# Patient Record
Sex: MALE | Race: WHITE | Employment: FULL TIME | URBAN - NONMETROPOLITAN AREA
[De-identification: names, ages, dates, MRNs, and addresses within clinical notes are randomized per-mention and may not be internally consistent; named-entity substitution may affect disease eponyms.]

---

## 2017-12-15 ENCOUNTER — HOSPITAL ENCOUNTER (EMERGENCY)
Age: 52
Discharge: HOME | End: 2017-12-15

## 2017-12-15 ENCOUNTER — APPOINTMENT (OUTPATIENT)
Dept: GENERAL RADIOLOGY | Age: 52
End: 2017-12-15

## 2017-12-15 VITALS
TEMPERATURE: 97.8 F | HEART RATE: 86 BPM | OXYGEN SATURATION: 97 % | WEIGHT: 180 LBS | DIASTOLIC BLOOD PRESSURE: 91 MMHG | RESPIRATION RATE: 18 BRPM | HEIGHT: 71 IN | BODY MASS INDEX: 25.2 KG/M2 | SYSTOLIC BLOOD PRESSURE: 144 MMHG

## 2017-12-15 DIAGNOSIS — M77.8 SHOULDER TENDONITIS, RIGHT: Primary | ICD-10-CM

## 2017-12-15 PROCEDURE — 99283 EMERGENCY DEPT VISIT LOW MDM: CPT

## 2017-12-15 PROCEDURE — 73030 X-RAY EXAM OF SHOULDER: CPT

## 2017-12-15 PROCEDURE — 99283 EMERGENCY DEPT VISIT LOW MDM: CPT | Performed by: NURSE PRACTITIONER

## 2017-12-15 PROCEDURE — 6370000000 HC RX 637 (ALT 250 FOR IP): Performed by: NURSE PRACTITIONER

## 2017-12-15 RX ORDER — HYDROCODONE BITARTRATE AND ACETAMINOPHEN 5; 325 MG/1; MG/1
1 TABLET ORAL EVERY 6 HOURS PRN
Qty: 10 TABLET | Refills: 0 | Status: SHIPPED | OUTPATIENT
Start: 2017-12-15 | End: 2018-05-14

## 2017-12-15 RX ORDER — CYCLOBENZAPRINE HCL 10 MG
10 TABLET ORAL 3 TIMES DAILY PRN
Qty: 20 TABLET | Refills: 0 | Status: SHIPPED | OUTPATIENT
Start: 2017-12-15 | End: 2018-05-16 | Stop reason: ALTCHOICE

## 2017-12-15 RX ORDER — HYDROCODONE BITARTRATE AND ACETAMINOPHEN 7.5; 325 MG/1; MG/1
1 TABLET ORAL ONCE
Status: COMPLETED | OUTPATIENT
Start: 2017-12-15 | End: 2017-12-15

## 2017-12-15 RX ADMIN — HYDROCODONE BITARTRATE AND ACETAMINOPHEN 1 TABLET: 7.5; 325 TABLET ORAL at 11:21

## 2017-12-15 ASSESSMENT — PAIN SCALES - GENERAL
PAINLEVEL_OUTOF10: 10
PAINLEVEL_OUTOF10: 5

## 2017-12-15 ASSESSMENT — ENCOUNTER SYMPTOMS
RESPIRATORY NEGATIVE: 1
GASTROINTESTINAL NEGATIVE: 1

## 2017-12-15 ASSESSMENT — PAIN DESCRIPTION - DESCRIPTORS: DESCRIPTORS: CONSTANT;BURNING

## 2017-12-15 ASSESSMENT — PAIN DESCRIPTION - ORIENTATION: ORIENTATION: RIGHT

## 2017-12-15 ASSESSMENT — PAIN DESCRIPTION - LOCATION: LOCATION: SHOULDER

## 2017-12-15 NOTE — ED PROVIDER NOTES
[facility]  eMERGENCY dEPARTMENT eNCOUnter      Pt Name: Anna Mcgrath  MRN: [id number]  Harle[place]foctavio 1965  Date of evaluation: 12/15/2017  Provider: Faina Banda, [address]       Chief Complaint   Patient presents with    Shoulder Pain         HISTORY OF PRESENT ILLNESS   (Location/Symptom, Timing/Onset, Context/Setting, Quality, Duration, Modifying Factors, Severity)  Note limiting factors. Anna Mcgrath is a 45 y.o. male who presents to the emergency department for evaluation of shoulder pain. Pt tells me that he injured his shoulder today removing a chain saw that become lodged in a tree branch. He denies fall as well as chest pain. He has had no abdominal pain or recent illness. He tells me that he has been taking otc pain medication without much improvement in symptoms. HPI    Nursing Notes were reviewed. REVIEW OF SYSTEMS    (2-9 systems for level 4, 10 or more for level 5)     Review of Systems   Constitutional: Negative. Respiratory: Negative. Cardiovascular: Negative. Gastrointestinal: Negative. Musculoskeletal: Positive for arthralgias (right shoulder). A complete review of systems was performed and is negative except as noted above in the HPI. PAST MEDICAL HISTORY     Past Medical History:   Diagnosis Date    Arthritis     Back pain     History of blood transfusion     Hypertension          SURGICAL HISTORY       Past Surgical History:   Procedure Laterality Date    ABDOMEN SURGERY      APPENDECTOMY      CHOLECYSTECTOMY      FRACTURE SURGERY      SPLENECTOMY      SPLENECTOMY N/A 1981    ?          CURRENT MEDICATIONS       Discharge Medication List as of 12/15/2017 11:21 AM      CONTINUE these medications which have NOT CHANGED    Details   pantoprazole (PROTONIX) 40 MG tablet Take 1 tablet by mouth daily, Disp-30 tablet, R-3Print      Acetaminophen (TYLENOL 8 HOUR PO) Take by mouthHistorical Med      ibuprofen (ADVIL;MOTRIN) 200 MG tablet Take 200 mg by mouth every 6 hours as needed for PainHistorical Med      naproxen (NAPROSYN) 250 MG tablet Take 250 mg by mouth 2 times daily as needed for PainHistorical Med             ALLERGIES     Aspirin; Morphine; and Penicillins    FAMILY HISTORY       Family History   Problem Relation Age of Onset    Diabetes Father     Arthritis Father           SOCIAL HISTORY       Social History     Social History    Marital status:      Spouse name: N/A    Number of children: 3    Years of education: 12     Social History Main Topics    Smoking status: Current Every Day Smoker     Packs/day: 1.00     Years: 41.00     Types: Cigarettes    Smokeless tobacco: Current User    Alcohol use No    Drug use: No    Sexual activity: Yes     Partners: Female     Other Topics Concern    None     Social History Narrative    None       SCREENINGS             PHYSICAL EXAM    (up to 7 for level 4, 8 or more for level 5)     ED Triage Vitals [12/15/17 0946]   BP Temp Temp Source Pulse Resp SpO2 Height Weight   (!) 144/91 97.8 °F (36.6 °C) Oral 86 18 97 % 5' 11\" (1.803 m) 180 lb (81.6 kg)       Physical Exam   Constitutional: He is oriented to person, place, and time. He appears well-nourished. HENT:   Head: Normocephalic. Neck:   No direct ttp posterior cervical spine   Cardiovascular: Normal rate, regular rhythm and normal heart sounds. Pulmonary/Chest: Effort normal and breath sounds normal.   Abdominal: Soft. Bowel sounds are normal. There is no tenderness. Musculoskeletal: Normal range of motion. Decreased ROM with ttp anterior right shoulder  CMS intact right upper extremity'  Normal flexion/extension of right elbow/wrist   Neurological: He is alert and oriented to person, place, and time. Skin: Skin is warm and dry.        DIAGNOSTIC RESULTS     EKG: All EKG's are interpreted by the Emergency Department Physician who either signs or Co-signs this chart in the absence of a cardiologist.        RADIOLOGY:   Non-plain film images such as CT, Ultrasound and MRI are read by the radiologist. Plain radiographic images are visualized and preliminarily interpreted by the emergency physician with the below findings:        Interpretation per the Radiologist below, if available at the time of this note:    XR SHOULDER RIGHT (MIN 2 VIEWS)   Final Result   No acute findings. Signed by Dr Kenya Clement on 12/15/2017 10:18 AM            ED BEDSIDE ULTRASOUND:   Performed by ED Physician - none    LABS:  Labs Reviewed - No data to display    All other labs were within normal range or not returned as of this dictation. RE-ASSESSMENT           EMERGENCY DEPARTMENT COURSE and DIFFERENTIAL DIAGNOSIS/MDM:   Vitals:    Vitals:    12/15/17 0946 12/15/17 1127   BP: (!) 144/91 (!) 144/91   Pulse: 86 86   Resp: 18 18   Temp: 97.8 °F (36.6 °C) 97.8 °F (36.6 °C)   TempSrc: Oral    SpO2: 97% 97%   Weight: 180 lb (81.6 kg)    Height: 5' 11\" (1.803 m)        MDM      CONSULTS:  None    PROCEDURES:  Unless otherwise noted below, none     Procedures    FINAL IMPRESSION      1. Shoulder tendonitis, right          DISPOSITION/PLAN   DISPOSITION Decision to Discharge    PATIENT REFERRED TO:  MD Lam [address]  [place] [phone]    Schedule an appointment as soon as possible for a visit in 5 days  fail to improve      DISCHARGE MEDICATIONS:    Attestation: The Prescription Monitoring Report for this patient was reviewed today. ([id number]) DEMETRIS Villa)  Documentation: No signs of potential drug abuse or diversion identified.  DEMETRIS Villa)  Discharge Medication List as of 12/15/2017 11:21 AM           Medication List      START taking these medications    cyclobenzaprine 10 MG tablet  Commonly known as:  FLEXERIL  Take 1 tablet by mouth 3 times daily as needed for Muscle spasms     HYDROcodone-acetaminophen 5-325 MG per tablet  Commonly known as: NORCO  Take 1 tablet by mouth every 6 hours as needed for Pain .         ASK your doctor about these medications    ibuprofen 200 MG tablet  Commonly known as:  ADVIL;MOTRIN     naproxen 250 MG tablet  Commonly known as:  NAPROSYN     pantoprazole 40 MG tablet  Commonly known as:  PROTONIX  Take 1 tablet by mouth daily     TYLENOL 8 HOUR PO           Where to Get Your Medications      You can get these medications from any pharmacy    Bring a paper prescription for each of these medications  · cyclobenzaprine 10 MG tablet  · HYDROcodone-acetaminophen 5-325 MG per tablet           (Please note that portions of this note were completed with a voice recognition program.  Efforts were made to edit the dictations but occasionally words are mis-transcribed.)              Alea Posadas, APRN  12/15/17 7213

## 2018-05-14 ENCOUNTER — HOSPITAL ENCOUNTER (EMERGENCY)
Age: 53
Discharge: HOME | End: 2018-05-14
Attending: EMERGENCY MEDICINE

## 2018-05-14 ENCOUNTER — APPOINTMENT (OUTPATIENT)
Dept: MRI IMAGING | Age: 53
End: 2018-05-14

## 2018-05-14 ENCOUNTER — APPOINTMENT (OUTPATIENT)
Dept: GENERAL RADIOLOGY | Age: 53
End: 2018-05-14

## 2018-05-14 VITALS
DIASTOLIC BLOOD PRESSURE: 64 MMHG | HEART RATE: 82 BPM | RESPIRATION RATE: 16 BRPM | SYSTOLIC BLOOD PRESSURE: 126 MMHG | HEIGHT: 71 IN | WEIGHT: 210 LBS | TEMPERATURE: 98 F | BODY MASS INDEX: 29.4 KG/M2 | OXYGEN SATURATION: 95 %

## 2018-05-14 DIAGNOSIS — S83.232A COMPLEX TEAR OF MEDIAL MENISCUS OF LEFT KNEE, UNSPECIFIED WHETHER OLD OR CURRENT TEAR, INITIAL ENCOUNTER: ICD-10-CM

## 2018-05-14 DIAGNOSIS — M25.562 ACUTE PAIN OF LEFT KNEE: Primary | ICD-10-CM

## 2018-05-14 LAB
ALBUMIN SERPL-MCNC: 4 G/DL (ref 3.5–5.2)
ALP BLD-CCNC: 110 U/L (ref 40–130)
ALT SERPL-CCNC: 19 U/L (ref 5–41)
ANION GAP SERPL CALCULATED.3IONS-SCNC: 12 MMOL/L (ref 7–19)
AST SERPL-CCNC: 11 U/L (ref 5–40)
BASOPHILS ABSOLUTE: 0.1 K/UL (ref 0–0.2)
BASOPHILS RELATIVE PERCENT: 0.5 % (ref 0–1)
BILIRUB SERPL-MCNC: <0.2 MG/DL (ref 0.2–1.2)
BUN BLDV-MCNC: 16 MG/DL (ref 6–20)
C-REACTIVE PROTEIN: 3.65 MG/DL (ref 0–0.5)
CALCIUM SERPL-MCNC: 9.2 MG/DL (ref 8.6–10)
CHLORIDE BLD-SCNC: 100 MMOL/L (ref 98–111)
CO2: 26 MMOL/L (ref 22–29)
CREAT SERPL-MCNC: 0.7 MG/DL (ref 0.5–1.2)
EOSINOPHILS ABSOLUTE: 0.3 K/UL (ref 0–0.6)
EOSINOPHILS RELATIVE PERCENT: 1.5 % (ref 0–5)
GFR NON-AFRICAN AMERICAN: >60
GLUCOSE BLD-MCNC: 123 MG/DL (ref 74–109)
HCT VFR BLD CALC: 44.7 % (ref 42–52)
HEMOGLOBIN: 14.6 G/DL (ref 14–18)
LYMPHOCYTES ABSOLUTE: 2.8 K/UL (ref 1.1–4.5)
LYMPHOCYTES RELATIVE PERCENT: 13.6 % (ref 20–40)
MCH RBC QN AUTO: 30.4 PG (ref 27–31)
MCHC RBC AUTO-ENTMCNC: 32.7 G/DL (ref 33–37)
MCV RBC AUTO: 92.9 FL (ref 80–94)
MONOCYTES ABSOLUTE: 2.1 K/UL (ref 0–0.9)
MONOCYTES RELATIVE PERCENT: 10.3 % (ref 0–10)
NEUTROPHILS ABSOLUTE: 15.3 K/UL (ref 1.5–7.5)
NEUTROPHILS RELATIVE PERCENT: 73.6 % (ref 50–65)
PDW BLD-RTO: 13.3 % (ref 11.5–14.5)
PLATELET # BLD: 441 K/UL (ref 130–400)
PMV BLD AUTO: 8.2 FL (ref 9.4–12.4)
POTASSIUM SERPL-SCNC: 4.2 MMOL/L (ref 3.5–5)
RBC # BLD: 4.81 M/UL (ref 4.7–6.1)
SEDIMENTATION RATE, ERYTHROCYTE: 18 MM/HR (ref 0–15)
SODIUM BLD-SCNC: 138 MMOL/L (ref 136–145)
TOTAL PROTEIN: 7.5 G/DL (ref 6.6–8.7)
WBC # BLD: 20.8 K/UL (ref 4.8–10.8)

## 2018-05-14 PROCEDURE — 6360000004 HC RX CONTRAST MEDICATION: Performed by: EMERGENCY MEDICINE

## 2018-05-14 PROCEDURE — 87040 BLOOD CULTURE FOR BACTERIA: CPT

## 2018-05-14 PROCEDURE — 96375 TX/PRO/DX INJ NEW DRUG ADDON: CPT

## 2018-05-14 PROCEDURE — 80053 COMPREHEN METABOLIC PANEL: CPT

## 2018-05-14 PROCEDURE — 6360000002 HC RX W HCPCS: Performed by: EMERGENCY MEDICINE

## 2018-05-14 PROCEDURE — 6370000000 HC RX 637 (ALT 250 FOR IP): Performed by: EMERGENCY MEDICINE

## 2018-05-14 PROCEDURE — 86140 C-REACTIVE PROTEIN: CPT

## 2018-05-14 PROCEDURE — 99284 EMERGENCY DEPT VISIT MOD MDM: CPT

## 2018-05-14 PROCEDURE — 96365 THER/PROPH/DIAG IV INF INIT: CPT

## 2018-05-14 PROCEDURE — 73560 X-RAY EXAM OF KNEE 1 OR 2: CPT

## 2018-05-14 PROCEDURE — 36415 COLL VENOUS BLD VENIPUNCTURE: CPT

## 2018-05-14 PROCEDURE — 99284 EMERGENCY DEPT VISIT MOD MDM: CPT | Performed by: EMERGENCY MEDICINE

## 2018-05-14 PROCEDURE — 73723 MRI JOINT LWR EXTR W/O&W/DYE: CPT

## 2018-05-14 PROCEDURE — 85025 COMPLETE CBC W/AUTO DIFF WBC: CPT

## 2018-05-14 PROCEDURE — A9577 INJ MULTIHANCE: HCPCS | Performed by: EMERGENCY MEDICINE

## 2018-05-14 PROCEDURE — 85652 RBC SED RATE AUTOMATED: CPT

## 2018-05-14 PROCEDURE — 96366 THER/PROPH/DIAG IV INF ADDON: CPT

## 2018-05-14 RX ORDER — HYDROCODONE BITARTRATE AND ACETAMINOPHEN 5; 325 MG/1; MG/1
1 TABLET ORAL EVERY 6 HOURS PRN
Qty: 12 TABLET | Refills: 0 | Status: SHIPPED | OUTPATIENT
Start: 2018-05-14 | End: 2018-05-17

## 2018-05-14 RX ORDER — LORAZEPAM 1 MG/1
1 TABLET ORAL ONCE
Status: COMPLETED | OUTPATIENT
Start: 2018-05-14 | End: 2018-05-14

## 2018-05-14 RX ORDER — DEXAMETHASONE SODIUM PHOSPHATE 10 MG/ML
10 INJECTION, SOLUTION INTRAMUSCULAR; INTRAVENOUS ONCE
Status: COMPLETED | OUTPATIENT
Start: 2018-05-14 | End: 2018-05-14

## 2018-05-14 RX ORDER — VANCOMYCIN HYDROCHLORIDE 1 G/200ML
1000 INJECTION, SOLUTION INTRAVENOUS ONCE
Status: COMPLETED | OUTPATIENT
Start: 2018-05-14 | End: 2018-05-14

## 2018-05-14 RX ORDER — GABAPENTIN 300 MG/1
300 CAPSULE ORAL 3 TIMES DAILY
Status: DISCONTINUED | OUTPATIENT
Start: 2018-05-14 | End: 2018-05-14 | Stop reason: HOSPADM

## 2018-05-14 RX ORDER — HYDROMORPHONE HCL IN 0.9% NACL 0.5 MG/ML
0.5 SYRINGE (ML) INTRAVENOUS ONCE
Status: COMPLETED | OUTPATIENT
Start: 2018-05-14 | End: 2018-05-14

## 2018-05-14 RX ORDER — HYDROCODONE BITARTRATE AND ACETAMINOPHEN 7.5; 325 MG/1; MG/1
1 TABLET ORAL ONCE
Status: COMPLETED | OUTPATIENT
Start: 2018-05-14 | End: 2018-05-14

## 2018-05-14 RX ADMIN — DEXAMETHASONE SODIUM PHOSPHATE 10 MG: 10 INJECTION, SOLUTION INTRAMUSCULAR; INTRAVENOUS at 04:28

## 2018-05-14 RX ADMIN — GABAPENTIN 300 MG: 300 CAPSULE ORAL at 10:08

## 2018-05-14 RX ADMIN — GADOBENATE DIMEGLUMINE 17 ML: 529 INJECTION, SOLUTION INTRAVENOUS at 13:22

## 2018-05-14 RX ADMIN — VANCOMYCIN HYDROCHLORIDE 1000 MG: 1 INJECTION, SOLUTION INTRAVENOUS at 06:09

## 2018-05-14 RX ADMIN — LORAZEPAM 1 MG: 1 TABLET ORAL at 04:16

## 2018-05-14 RX ADMIN — HYDROCODONE BITARTRATE AND ACETAMINOPHEN 1 TABLET: 7.5; 325 TABLET ORAL at 04:16

## 2018-05-14 RX ADMIN — Medication 0.5 MG: at 13:51

## 2018-05-14 RX ADMIN — GABAPENTIN 300 MG: 300 CAPSULE ORAL at 04:28

## 2018-05-14 ASSESSMENT — ENCOUNTER SYMPTOMS
VOMITING: 0
TROUBLE SWALLOWING: 0
EYE PAIN: 0
SINUS PRESSURE: 0
NAUSEA: 0
WHEEZING: 0
BACK PAIN: 0
PHOTOPHOBIA: 0
CONSTIPATION: 0
CHEST TIGHTNESS: 0
DIARRHEA: 0
COLOR CHANGE: 0
SHORTNESS OF BREATH: 0
ABDOMINAL PAIN: 0

## 2018-05-14 ASSESSMENT — PAIN SCALES - GENERAL
PAINLEVEL_OUTOF10: 6
PAINLEVEL_OUTOF10: 10
PAINLEVEL_OUTOF10: 10

## 2018-05-14 ASSESSMENT — PAIN DESCRIPTION - DESCRIPTORS: DESCRIPTORS: TINGLING;STABBING

## 2018-05-14 ASSESSMENT — PAIN DESCRIPTION - LOCATION: LOCATION: KNEE

## 2018-05-14 ASSESSMENT — PAIN DESCRIPTION - ORIENTATION: ORIENTATION: LEFT

## 2018-05-16 ENCOUNTER — APPOINTMENT (OUTPATIENT)
Dept: GENERAL RADIOLOGY | Age: 53
End: 2018-05-16

## 2018-05-16 ENCOUNTER — HOSPITAL ENCOUNTER (EMERGENCY)
Age: 53
Discharge: HOME | End: 2018-05-16
Payer: COMMERCIAL

## 2018-05-16 VITALS
DIASTOLIC BLOOD PRESSURE: 83 MMHG | HEART RATE: 81 BPM | OXYGEN SATURATION: 97 % | WEIGHT: 210 LBS | HEIGHT: 71 IN | BODY MASS INDEX: 29.4 KG/M2 | SYSTOLIC BLOOD PRESSURE: 146 MMHG | TEMPERATURE: 98.5 F

## 2018-05-16 DIAGNOSIS — M25.511 ACUTE PAIN OF RIGHT SHOULDER: Primary | ICD-10-CM

## 2018-05-16 PROCEDURE — 6360000002 HC RX W HCPCS: Performed by: NURSE PRACTITIONER

## 2018-05-16 PROCEDURE — 99283 EMERGENCY DEPT VISIT LOW MDM: CPT

## 2018-05-16 PROCEDURE — 99283 EMERGENCY DEPT VISIT LOW MDM: CPT | Performed by: NURSE PRACTITIONER

## 2018-05-16 PROCEDURE — 96372 THER/PROPH/DIAG INJ SC/IM: CPT

## 2018-05-16 PROCEDURE — 73030 X-RAY EXAM OF SHOULDER: CPT

## 2018-05-16 RX ORDER — DEXAMETHASONE SODIUM PHOSPHATE 10 MG/ML
10 INJECTION INTRAMUSCULAR; INTRAVENOUS ONCE
Status: COMPLETED | OUTPATIENT
Start: 2018-05-16 | End: 2018-05-16

## 2018-05-16 RX ORDER — PREDNISONE 10 MG/1
10 TABLET ORAL DAILY
Qty: 42 TABLET | Refills: 0 | Status: SHIPPED | OUTPATIENT
Start: 2018-05-16 | End: 2018-05-26

## 2018-05-16 RX ADMIN — DEXAMETHASONE SODIUM PHOSPHATE 10 MG: 10 INJECTION INTRAMUSCULAR; INTRAVENOUS at 22:41

## 2018-05-16 ASSESSMENT — PAIN DESCRIPTION - PAIN TYPE: TYPE: ACUTE PAIN;CHRONIC PAIN

## 2018-05-16 ASSESSMENT — ENCOUNTER SYMPTOMS
DIARRHEA: 0
RHINORRHEA: 0
COUGH: 0
VOMITING: 0
ABDOMINAL PAIN: 0
SORE THROAT: 0
CONSTIPATION: 0
TROUBLE SWALLOWING: 0
SHORTNESS OF BREATH: 0
NAUSEA: 0

## 2018-05-16 ASSESSMENT — PAIN SCALES - GENERAL: PAINLEVEL_OUTOF10: 10

## 2018-05-17 ENCOUNTER — HOSPITAL ENCOUNTER (EMERGENCY)
Age: 53
Discharge: HOME | End: 2018-05-17
Attending: EMERGENCY MEDICINE
Payer: COMMERCIAL

## 2018-05-17 VITALS
BODY MASS INDEX: 28 KG/M2 | TEMPERATURE: 98 F | SYSTOLIC BLOOD PRESSURE: 154 MMHG | HEART RATE: 78 BPM | WEIGHT: 200 LBS | OXYGEN SATURATION: 99 % | RESPIRATION RATE: 20 BRPM | DIASTOLIC BLOOD PRESSURE: 78 MMHG | HEIGHT: 71 IN

## 2018-05-17 DIAGNOSIS — M25.511 ACUTE PAIN OF RIGHT SHOULDER: Primary | ICD-10-CM

## 2018-05-17 PROCEDURE — 99283 EMERGENCY DEPT VISIT LOW MDM: CPT | Performed by: EMERGENCY MEDICINE

## 2018-05-17 PROCEDURE — 6360000002 HC RX W HCPCS: Performed by: EMERGENCY MEDICINE

## 2018-05-17 PROCEDURE — 99282 EMERGENCY DEPT VISIT SF MDM: CPT

## 2018-05-17 PROCEDURE — 96372 THER/PROPH/DIAG INJ SC/IM: CPT

## 2018-05-17 RX ORDER — ONDANSETRON 4 MG/1
4 TABLET, ORALLY DISINTEGRATING ORAL ONCE
Status: COMPLETED | OUTPATIENT
Start: 2018-05-17 | End: 2018-05-17

## 2018-05-17 RX ORDER — OXYCODONE AND ACETAMINOPHEN 10; 325 MG/1; MG/1
1 TABLET ORAL EVERY 6 HOURS PRN
Qty: 15 TABLET | Refills: 0 | Status: SHIPPED | OUTPATIENT
Start: 2018-05-17 | End: 2018-05-20

## 2018-05-17 RX ORDER — HYDROMORPHONE HCL IN 0.9% NACL 0.5 MG/ML
2 SYRINGE (ML) INTRAVENOUS ONCE
Status: COMPLETED | OUTPATIENT
Start: 2018-05-17 | End: 2018-05-17

## 2018-05-17 RX ADMIN — Medication 2 MG: at 04:03

## 2018-05-17 RX ADMIN — ONDANSETRON 4 MG: 4 TABLET, ORALLY DISINTEGRATING ORAL at 04:03

## 2018-05-17 ASSESSMENT — ENCOUNTER SYMPTOMS
APNEA: 0
SINUS PRESSURE: 0
VOICE CHANGE: 0
BLOOD IN STOOL: 0
DIARRHEA: 0
EYE DISCHARGE: 0
NAUSEA: 0
CHOKING: 0
CONSTIPATION: 0
SORE THROAT: 0
FACIAL SWELLING: 0

## 2018-05-17 ASSESSMENT — PAIN SCALES - GENERAL
PAINLEVEL_OUTOF10: 6
PAINLEVEL_OUTOF10: 2

## 2018-05-17 ASSESSMENT — PAIN DESCRIPTION - ORIENTATION: ORIENTATION: RIGHT

## 2018-05-17 ASSESSMENT — PAIN DESCRIPTION - LOCATION: LOCATION: SHOULDER

## 2018-05-19 LAB
BLOOD CULTURE, ROUTINE: NORMAL
CULTURE, BLOOD 2: NORMAL

## 2018-08-23 ENCOUNTER — HOSPITAL ENCOUNTER (EMERGENCY)
Age: 53
Discharge: HOME | End: 2018-08-23
Attending: FAMILY MEDICINE
Payer: COMMERCIAL

## 2018-08-23 ENCOUNTER — APPOINTMENT (OUTPATIENT)
Dept: GENERAL RADIOLOGY | Age: 53
End: 2018-08-23
Payer: COMMERCIAL

## 2018-08-23 VITALS
DIASTOLIC BLOOD PRESSURE: 78 MMHG | BODY MASS INDEX: 25.2 KG/M2 | TEMPERATURE: 98.2 F | OXYGEN SATURATION: 90 % | SYSTOLIC BLOOD PRESSURE: 137 MMHG | RESPIRATION RATE: 16 BRPM | HEART RATE: 83 BPM | HEIGHT: 71 IN | WEIGHT: 180 LBS

## 2018-08-23 DIAGNOSIS — M10.072 ACUTE IDIOPATHIC GOUT OF LEFT ANKLE: ICD-10-CM

## 2018-08-23 DIAGNOSIS — J40 BRONCHITIS: ICD-10-CM

## 2018-08-23 DIAGNOSIS — R07.89 CHEST WALL PAIN: Primary | ICD-10-CM

## 2018-08-23 LAB
ALBUMIN SERPL-MCNC: 4.3 G/DL (ref 3.5–5.2)
ALP BLD-CCNC: 135 U/L (ref 40–130)
ALT SERPL-CCNC: 61 U/L (ref 5–41)
ANION GAP SERPL CALCULATED.3IONS-SCNC: 13 MMOL/L (ref 7–19)
AST SERPL-CCNC: 37 U/L (ref 5–40)
BASE EXCESS ARTERIAL: 3.5 MMOL/L (ref -2–2)
BASOPHILS ABSOLUTE: 0.1 K/UL (ref 0–0.2)
BASOPHILS RELATIVE PERCENT: 0.7 % (ref 0–1)
BILIRUB SERPL-MCNC: <0.2 MG/DL (ref 0.2–1.2)
BUN BLDV-MCNC: 12 MG/DL (ref 6–20)
C-REACTIVE PROTEIN: 3.49 MG/DL (ref 0–0.5)
CALCIUM SERPL-MCNC: 9.6 MG/DL (ref 8.6–10)
CARBOXYHEMOGLOBIN ARTERIAL: 5.4 % (ref 0–5)
CHLORIDE BLD-SCNC: 97 MMOL/L (ref 98–111)
CO2: 26 MMOL/L (ref 22–29)
CREAT SERPL-MCNC: 0.6 MG/DL (ref 0.5–1.2)
D DIMER: 0.43 UG/ML FEU (ref 0–0.48)
EOSINOPHILS ABSOLUTE: 0.1 K/UL (ref 0–0.6)
EOSINOPHILS RELATIVE PERCENT: 0.6 % (ref 0–5)
GFR NON-AFRICAN AMERICAN: >60
GLUCOSE BLD-MCNC: 132 MG/DL (ref 74–109)
HCO3 ARTERIAL: 26.8 MMOL/L (ref 22–26)
HCT VFR BLD CALC: 51.5 % (ref 42–52)
HEMOGLOBIN, ART, EXTENDED: 17.4 G/DL (ref 14–18)
HEMOGLOBIN: 17.3 G/DL (ref 14–18)
LYMPHOCYTES ABSOLUTE: 1.5 K/UL (ref 1.1–4.5)
LYMPHOCYTES RELATIVE PERCENT: 9.4 % (ref 20–40)
MCH RBC QN AUTO: 31.5 PG (ref 27–31)
MCHC RBC AUTO-ENTMCNC: 33.6 G/DL (ref 33–37)
MCV RBC AUTO: 93.6 FL (ref 80–94)
METHEMOGLOBIN ARTERIAL: 1.2 %
MONOCYTES ABSOLUTE: 1.1 K/UL (ref 0–0.9)
MONOCYTES RELATIVE PERCENT: 7.1 % (ref 0–10)
NEUTROPHILS ABSOLUTE: 13.1 K/UL (ref 1.5–7.5)
NEUTROPHILS RELATIVE PERCENT: 81.6 % (ref 50–65)
O2 CONTENT ARTERIAL: 22.3 ML/DL
O2 SAT, ARTERIAL: 91.4 %
O2 THERAPY: ABNORMAL
PCO2 ARTERIAL: 36 MMHG (ref 35–45)
PDW BLD-RTO: 13.3 % (ref 11.5–14.5)
PERFORMED ON: NORMAL
PERFORMED ON: NORMAL
PH ARTERIAL: 7.48 (ref 7.35–7.45)
PLATELET # BLD: 475 K/UL (ref 130–400)
PMV BLD AUTO: 8.5 FL (ref 9.4–12.4)
PO2 ARTERIAL: 74 MMHG (ref 80–100)
POC TROPONIN I: 0 NG/ML (ref 0–0.08)
POC TROPONIN I: 0 NG/ML (ref 0–0.08)
POTASSIUM SERPL-SCNC: 4.5 MMOL/L (ref 3.5–5)
POTASSIUM, WHOLE BLOOD: 4.3
PRO-BNP: 8 PG/ML (ref 0–900)
RBC # BLD: 5.5 M/UL (ref 4.7–6.1)
SODIUM BLD-SCNC: 136 MMOL/L (ref 136–145)
TOTAL PROTEIN: 7.9 G/DL (ref 6.6–8.7)
URIC ACID, SERUM: 6.3 MG/DL (ref 3.4–7)
WBC # BLD: 16.1 K/UL (ref 4.8–10.8)

## 2018-08-23 PROCEDURE — 6360000002 HC RX W HCPCS: Performed by: FAMILY MEDICINE

## 2018-08-23 PROCEDURE — 96374 THER/PROPH/DIAG INJ IV PUSH: CPT

## 2018-08-23 PROCEDURE — 84484 ASSAY OF TROPONIN QUANT: CPT

## 2018-08-23 PROCEDURE — 83880 ASSAY OF NATRIURETIC PEPTIDE: CPT

## 2018-08-23 PROCEDURE — 36415 COLL VENOUS BLD VENIPUNCTURE: CPT

## 2018-08-23 PROCEDURE — 71045 X-RAY EXAM CHEST 1 VIEW: CPT

## 2018-08-23 PROCEDURE — 36600 WITHDRAWAL OF ARTERIAL BLOOD: CPT

## 2018-08-23 PROCEDURE — 84550 ASSAY OF BLOOD/URIC ACID: CPT

## 2018-08-23 PROCEDURE — 86140 C-REACTIVE PROTEIN: CPT

## 2018-08-23 PROCEDURE — 85025 COMPLETE CBC W/AUTO DIFF WBC: CPT

## 2018-08-23 PROCEDURE — 82803 BLOOD GASES ANY COMBINATION: CPT

## 2018-08-23 PROCEDURE — 80053 COMPREHEN METABOLIC PANEL: CPT

## 2018-08-23 PROCEDURE — 96375 TX/PRO/DX INJ NEW DRUG ADDON: CPT

## 2018-08-23 PROCEDURE — 99284 EMERGENCY DEPT VISIT MOD MDM: CPT | Performed by: FAMILY MEDICINE

## 2018-08-23 PROCEDURE — 99285 EMERGENCY DEPT VISIT HI MDM: CPT

## 2018-08-23 PROCEDURE — 85379 FIBRIN DEGRADATION QUANT: CPT

## 2018-08-23 PROCEDURE — 93005 ELECTROCARDIOGRAM TRACING: CPT

## 2018-08-23 PROCEDURE — 2580000003 HC RX 258: Performed by: FAMILY MEDICINE

## 2018-08-23 PROCEDURE — 84132 ASSAY OF SERUM POTASSIUM: CPT

## 2018-08-23 RX ORDER — METHYLPREDNISOLONE SODIUM SUCCINATE 125 MG/2ML
125 INJECTION, POWDER, LYOPHILIZED, FOR SOLUTION INTRAMUSCULAR; INTRAVENOUS ONCE
Status: DISCONTINUED | OUTPATIENT
Start: 2018-08-23 | End: 2018-08-23 | Stop reason: HOSPADM

## 2018-08-23 RX ORDER — SODIUM CHLORIDE 9 MG/ML
INJECTION, SOLUTION INTRAVENOUS CONTINUOUS
Status: DISCONTINUED | OUTPATIENT
Start: 2018-08-23 | End: 2018-08-23 | Stop reason: HOSPADM

## 2018-08-23 RX ORDER — HYDROCODONE BITARTRATE AND ACETAMINOPHEN 5; 325 MG/1; MG/1
2 TABLET ORAL EVERY 6 HOURS PRN
Qty: 12 TABLET | Refills: 0 | Status: SHIPPED | OUTPATIENT
Start: 2018-08-23 | End: 2018-08-26

## 2018-08-23 RX ORDER — METHYLPREDNISOLONE 4 MG/1
TABLET ORAL
Qty: 1 KIT | Refills: 0 | Status: SHIPPED | OUTPATIENT
Start: 2018-08-23 | End: 2019-05-14 | Stop reason: ALTCHOICE

## 2018-08-23 RX ORDER — ONDANSETRON 2 MG/ML
4 INJECTION INTRAMUSCULAR; INTRAVENOUS ONCE
Status: COMPLETED | OUTPATIENT
Start: 2018-08-23 | End: 2018-08-23

## 2018-08-23 RX ORDER — DOXYCYCLINE HYCLATE 100 MG
100 TABLET ORAL 2 TIMES DAILY
Qty: 20 TABLET | Refills: 0 | Status: SHIPPED | OUTPATIENT
Start: 2018-08-23 | End: 2018-09-02

## 2018-08-23 RX ORDER — ONDANSETRON 4 MG/1
4 TABLET, ORALLY DISINTEGRATING ORAL EVERY 8 HOURS PRN
Qty: 15 TABLET | Refills: 0 | Status: SHIPPED | OUTPATIENT
Start: 2018-08-23 | End: 2019-10-06 | Stop reason: ALTCHOICE

## 2018-08-23 RX ADMIN — ONDANSETRON 4 MG: 2 INJECTION, SOLUTION INTRAMUSCULAR; INTRAVENOUS at 13:31

## 2018-08-23 RX ADMIN — Medication 1 MG: at 13:31

## 2018-08-23 RX ADMIN — SODIUM CHLORIDE: 9 INJECTION, SOLUTION INTRAVENOUS at 13:33

## 2018-08-23 ASSESSMENT — ENCOUNTER SYMPTOMS
COUGH: 1
COLOR CHANGE: 0
DIARRHEA: 0
VOMITING: 0
SORE THROAT: 0
BACK PAIN: 0
SHORTNESS OF BREATH: 1
NAUSEA: 0
ABDOMINAL PAIN: 0
WHEEZING: 0

## 2018-08-23 ASSESSMENT — PAIN SCALES - GENERAL
PAINLEVEL_OUTOF10: 8
PAINLEVEL_OUTOF10: 8

## 2018-08-23 ASSESSMENT — PAIN DESCRIPTION - LOCATION: LOCATION: CHEST

## 2018-08-23 NOTE — PROGRESS NOTES
Results for Chuck Rice (MRN [id number]) as of 8/23/2018 13:25   Ref.  Range 8/23/2018 13:23   Hemoglobin, Art, Extended Latest Ref Range: 14.0 - 18.0 g/dL 17.4   pH, Arterial Latest Ref Range: 7.350 - 7.450  7.480 (H)   pCO2, Arterial Latest Ref Range: 35.0 - 45.0 mmHg 36.0   pO2, Arterial Latest Ref Range: 80.0 - 100.0 mmHg 74.0 (L)   HCO3, Arterial Latest Ref Range: 22.0 - 26.0 mmol/L 26.8 (H)   Base Excess, Arterial Latest Ref Range: -2.0 - 2.0 mmol/L 3.5 (H)   O2 Sat, Arterial Latest Ref Range: >92 % 91.4   O2 Content, Arterial Latest Ref Range: Not Established mL/dL 22.3   Methemoglobin, Arterial Latest Ref Range: <1.5 % 1.2   Carboxyhgb, Arterial Latest Ref Range: 0.0 - 5.0 % 5.4 (H)   AT +  R/A   RR  RR 14  Resting

## 2018-08-23 NOTE — ED PROVIDER NOTES
FRACTURE SURGERY      SPLENECTOMY      SPLENECTOMY N/A 1981    ? CURRENT MEDICATIONS       Previous Medications    No medications on file       ALLERGIES     Aspirin; Morphine; and Penicillins    FAMILY HISTORY       Family History   Problem Relation Age of Onset    Diabetes Father     Arthritis Father           SOCIAL HISTORY       Social History     Social History    Marital status:      Spouse name: N/A    Number of children: 3    Years of education: 12     Social History Main Topics    Smoking status: Current Every Day Smoker     Packs/day: 1.00     Years: 41.00     Types: Cigarettes    Smokeless tobacco: Current User    Alcohol use No    Drug use: No    Sexual activity: Yes     Partners: Female     Other Topics Concern    Not on file     Social History Narrative    No narrative on file       SCREENINGS             PHYSICAL EXAM    (up to 7 for level 4, 8 or more for level 5)     ED Triage Vitals [08/23/18 1210]   BP Temp Temp src Pulse Resp SpO2 Height Weight   (!) 157/92 98.5 °F (36.9 °C) -- 100 19 94 % 5' 11\" (1.803 m) 180 lb (81.6 kg)       Physical Exam   Constitutional: He is oriented to person, place, and time. He appears well-developed and well-nourished. HENT:   Head: Normocephalic. Neck: Normal range of motion. Cardiovascular: Normal rate and normal heart sounds. Pulmonary/Chest: Effort normal and breath sounds normal. He exhibits tenderness. Abdominal: Soft. Bowel sounds are normal.   Musculoskeletal:        Feet:    Neurological: He is alert and oriented to person, place, and time. Psychiatric: He has a normal mood and affect.        DIAGNOSTIC RESULTS     EKG: All EKG's are interpreted by the Emergency Department Physician who either signs or Co-signs this chart in the absence of a cardiologist.    EKG at 12:11 PM shows a sinus rhythm with a rate of 92 QRS complexes, ST segments and T waves are all grossly normal    RADIOLOGY:   Non-plain film images such as CT, Ultrasound and MRI are read by the radiologist. Plain radiographic images are visualized and preliminarily interpreted by the emergency physician with the below findings:          XR CHEST PORTABLE   Final Result   1. Mild cardiomegaly with no evidence of pulmonary vascular   congestion. Signed by Dr Khloe Almeida on 8/23/2018 1:27 PM              LABS:  Labs Reviewed   CBC WITH AUTO DIFFERENTIAL - Abnormal; Notable for the following:        Result Value    WBC 16.1 (*)     MCH 31.5 (*)     Platelets 733 (*)     MPV 8.5 (*)     Neutrophils % 81.6 (*)     Lymphocytes % 9.4 (*)     Neutrophils # 13.1 (*)     Monocytes # 1.10 (*)     All other components within normal limits   COMPREHENSIVE METABOLIC PANEL - Abnormal; Notable for the following:     Chloride 97 (*)     Glucose 132 (*)     Alkaline Phosphatase 135 (*)     ALT 61 (*)     All other components within normal limits   BLOOD GAS, ARTERIAL - Abnormal; Notable for the following:     pH, Arterial 7.480 (*)     pO2, Arterial 74.0 (*)     HCO3, Arterial 26.8 (*)     Base Excess, Arterial 3.5 (*)     Carboxyhgb, Arterial 5.4 (*)     All other components within normal limits   C-REACTIVE PROTEIN - Abnormal; Notable for the following:     CRP 3.49 (*)     All other components within normal limits   D-DIMER, QUANTITATIVE   BRAIN NATRIURETIC PEPTIDE   URIC ACID   POTASSIUM, WHOLE BLOOD   POCT TROPONIN   POCT VENOUS   POCT VENOUS   POCT TROPONIN       All other labs were within normal range or not returned as of this dictation.     EMERGENCY DEPARTMENT COURSE and DIFFERENTIAL DIAGNOSIS/MDM:   Vitals:    Vitals:    08/23/18 1210 08/23/18 1301 08/23/18 1306 08/23/18 1430   BP: (!) 157/92 (!) 148/87  120/84   Pulse: 100 86 93 83   Resp: 19   16   Temp: 98.5 °F (36.9 °C)      SpO2: 94% 91% 93% 90%   Weight: 180 lb (81.6 kg)      Height: 5' 11\" (1.803 m)          MDM    Reassessment  3:20 PM patient's symptoms have improved his main complaint now is the

## 2018-08-24 LAB
EKG P AXIS: 49 DEGREES
EKG P AXIS: 52 DEGREES
EKG P-R INTERVAL: 126 MS
EKG P-R INTERVAL: 128 MS
EKG Q-T INTERVAL: 326 MS
EKG Q-T INTERVAL: 348 MS
EKG QRS DURATION: 86 MS
EKG QRS DURATION: 86 MS
EKG QTC CALCULATION (BAZETT): 382 MS
EKG QTC CALCULATION (BAZETT): 386 MS
EKG T AXIS: 1 DEGREES
EKG T AXIS: 7 DEGREES

## 2019-05-14 ENCOUNTER — OFFICE VISIT (OUTPATIENT)
Dept: INTERNAL MEDICINE | Age: 54
End: 2019-05-14

## 2019-05-14 VITALS
BODY MASS INDEX: 27.16 KG/M2 | OXYGEN SATURATION: 98 % | SYSTOLIC BLOOD PRESSURE: 122 MMHG | HEART RATE: 114 BPM | RESPIRATION RATE: 18 BRPM | DIASTOLIC BLOOD PRESSURE: 90 MMHG | HEIGHT: 71 IN | WEIGHT: 194 LBS

## 2019-05-14 DIAGNOSIS — Z00.00 HEALTHCARE MAINTENANCE: ICD-10-CM

## 2019-05-14 DIAGNOSIS — M25.50 ARTHRALGIA, UNSPECIFIED JOINT: Primary | ICD-10-CM

## 2019-05-14 DIAGNOSIS — G89.29 CHRONIC BACK PAIN, UNSPECIFIED BACK LOCATION, UNSPECIFIED BACK PAIN LATERALITY: ICD-10-CM

## 2019-05-14 DIAGNOSIS — D75.839 THROMBOCYTOSIS: Primary | ICD-10-CM

## 2019-05-14 DIAGNOSIS — Z90.81 H/O SPLENECTOMY: ICD-10-CM

## 2019-05-14 DIAGNOSIS — I15.9 SECONDARY HYPERTENSION: ICD-10-CM

## 2019-05-14 DIAGNOSIS — M54.9 CHRONIC BACK PAIN, UNSPECIFIED BACK LOCATION, UNSPECIFIED BACK PAIN LATERALITY: ICD-10-CM

## 2019-05-14 DIAGNOSIS — M00.852 ARTHRITIS OF LEFT HIP DUE TO OTHER BACTERIA (HCC): ICD-10-CM

## 2019-05-14 PROCEDURE — 99204 OFFICE O/P NEW MOD 45 MIN: CPT | Performed by: NURSE PRACTITIONER

## 2019-05-14 RX ORDER — METHOCARBAMOL 500 MG/1
500 TABLET, FILM COATED ORAL 4 TIMES DAILY
Qty: 40 TABLET | Refills: 0
Start: 2019-05-14 | End: 2019-05-24

## 2019-05-14 RX ORDER — SIMVASTATIN 20 MG
20 TABLET ORAL NIGHTLY
Qty: 90 TABLET | Refills: 1 | Status: SHIPPED | OUTPATIENT
Start: 2019-05-14 | End: 2020-02-17 | Stop reason: SDUPTHER

## 2019-05-14 RX ORDER — ROPINIROLE 0.5 MG/1
0.5 TABLET, FILM COATED ORAL 3 TIMES DAILY
Qty: 90 TABLET | Refills: 3 | Status: SHIPPED | OUTPATIENT
Start: 2019-05-14 | End: 2019-09-03 | Stop reason: SDUPTHER

## 2019-05-14 RX ORDER — COLCHICINE 0.6 MG/1
0.6 TABLET ORAL DAILY
COMMUNITY
End: 2019-10-16 | Stop reason: ALTCHOICE

## 2019-05-14 RX ORDER — LISINOPRIL 5 MG/1
5 TABLET ORAL DAILY
COMMUNITY
End: 2019-05-28 | Stop reason: SDUPTHER

## 2019-05-14 ASSESSMENT — ENCOUNTER SYMPTOMS
ABDOMINAL DISTENTION: 0
DIARRHEA: 0
BLOOD IN STOOL: 0
CONSTIPATION: 0
SHORTNESS OF BREATH: 0
WHEEZING: 0
VOMITING: 0
COUGH: 0
CHOKING: 0
COLOR CHANGE: 0
EYE DISCHARGE: 0
SORE THROAT: 0
STRIDOR: 0
EYE ITCHING: 0
NAUSEA: 0
ABDOMINAL PAIN: 0
TROUBLE SWALLOWING: 0

## 2019-05-14 ASSESSMENT — PATIENT HEALTH QUESTIONNAIRE - PHQ9
2. FEELING DOWN, DEPRESSED OR HOPELESS: 0
1. LITTLE INTEREST OR PLEASURE IN DOING THINGS: 0
SUM OF ALL RESPONSES TO PHQ QUESTIONS 1-9: 0
SUM OF ALL RESPONSES TO PHQ QUESTIONS 1-9: 0
SUM OF ALL RESPONSES TO PHQ9 QUESTIONS 1 & 2: 0

## 2019-05-14 NOTE — PROGRESS NOTES
[facility]  [facility]  [address] [place] [address]  Dept: [phone]  Dept Fax: [phone]: [phone]    Nelly Becerra is a 62 y.o. male who presents today for his medical conditions/complaints as noted below. Nelly Becerra is c/sharon Hypertension (Patient is here to establish care for hypertension.) and Gout (Patient has hystory of gout and was treated by urgent care.)        HPI:     HPI      1. HTN:  Stable on current meds; No side effects of the meds; Takes as directed; takes blood pressure 3-4 times a week   2. 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  Gout     Patient has hystory of gout and was treated by urgent care. Past Medical History:   Diagnosis Date    Arthritis     Back pain     History of blood transfusion     Hypertension       Past Surgical History:   Procedure Laterality Date    ABDOMEN SURGERY      APPENDECTOMY      FRACTURE SURGERY      SPLENECTOMY      SPLENECTOMY N/A 1981    ?        Vitals 5/14/2019 5/14/2019 8/23/2018 8/23/2018 8/23/2018 9/79/0277   SYSTOLIC 761 484 116 095 518 -   DIASTOLIC 90 90 78 81 84 -   Pulse - 114 83 89 83 -   Temp - - 98.2 - - -   Resp - 18 16 16 16 -   SpO2 - 98 90 90 90 -   Weight - 194 lb - - - -   Height - 5' 11\" - - - -   BMI (wt*703/ht~2) - 27.05 kg/m2 - - - -   Pain Level - - - - - 8   Some recent data might be hidden       Family History   Problem Relation Age of Onset    Diabetes Father     Arthritis Father        Social History     Tobacco Use    Smoking status: Current Every Day Smoker     Packs/day: 1.00     Years: 41.00     Pack years: 41.00     Types: Cigarettes    Smokeless tobacco: Current User   Substance Use Topics    Alcohol use: No      Current Outpatient Medications   Medication Sig Dispense Refill    lisinopril (PRINIVIL;ZESTRIL) 5 MG tablet Take 5 mg by mouth daily      APPLE CIDER VINEGAR PO Take by mouth      Potassium 99 MG TABS Take by mouth      Misc Natural Products (TART CHERRY ADVANCED PO) Take by mouth      methocarbamol (ROBAXIN) 500 MG tablet Take 1 tablet by mouth 4 times daily for 10 days 40 tablet 0    rOPINIRole (REQUIP) 0.5 MG tablet Take 1 tablet by mouth 3 times daily 90 tablet 3    colchicine (COLCRYS) 0.6 MG tablet Take 0.6 mg by mouth daily      ondansetron (ZOFRAN ODT) 4 MG disintegrating tablet Take 1 tablet by mouth every 8 hours as needed for Nausea or Vomiting 15 tablet 0     No current facility-administered medications for this visit.       Allergies   Allergen Reactions    Aspirin      Pt has tolerated ASA in the past as well as other NSAIDs    Morphine     Penicillins        Health Maintenance   Topic Date Due    Hepatitis C screen  1965    Pneumococcal 0-64 years Vaccine (1 of 1 - PPSV23) 01/18/1969    HIV screen  01/18/1978    DTaP/Tdap/Td vaccine (1 - Tdap) 01/18/1982    Lipid screen  01/18/2003    Diabetes screen  01/18/2003    Shingles Vaccine (1 of 2) 01/18/2013    Colon cancer screen colonoscopy  01/18/2013    Low dose CT lung screening  01/18/2018    Potassium monitoring  08/23/2019    Creatinine monitoring  08/23/2019    Flu vaccine (Season Ended) 09/01/2019       No results found for: LABA1C  No results found for: PSA, PSADIA  No results found for: TSH]  Lab Results   Component Value Date     08/23/2018    K 4.3 08/23/2018    CL 97 (L) 08/23/2018    CO2 26 08/23/2018    BUN 12 08/23/2018    CREATININE 0.6 08/23/2018    GLUCOSE 132 (H) 08/23/2018    CALCIUM 9.6 08/23/2018    PROT 7.9 08/23/2018    LABALBU 4.3 08/23/2018    BILITOT <0.2 08/23/2018    ALKPHOS 135 (H) 08/23/2018    AST 37 08/23/2018    ALT 61 (H) 08/23/2018    LABGLOM >60 08/23/2018     No results found for: CHOL  No results found for: TRIG  No results found for: HDL  No results found for: [facility]  Lab Results   Component Value Date     08/23/2018     03/07/2011    K 4.3 08/23/2018    K 4.5 08/23/2018    K 4.5 03/07/2011    CL 97 08/23/2018     03/07/2011    CO2 26 08/23/2018    BUN 12 08/23/2018    CREATININE 0.6 08/23/2018    CREATININE 0.8 03/07/2011    GLUCOSE 132 08/23/2018    CALCIUM 9.6 08/23/2018      Lab Results   Component Value Date    WBC 16.1 (H) 08/23/2018    HGB 17.3 08/23/2018    HCT 51.5 08/23/2018    MCV 93.6 08/23/2018     (H) 08/23/2018    LABLYMP 3.86 03/07/2011    LYMPHOPCT 9.4 (L) 08/23/2018    RBC 5.50 08/23/2018    MCH 31.5 (H) 08/23/2018    MCHC 33.6 08/23/2018    RDW 13.3 08/23/2018     No results found for: VITD25    Subjective:      Review of Systems   Constitutional: Negative for fatigue, fever and unexpected weight change. HENT: Negative for ear discharge, ear pain, mouth sores, sore throat and trouble swallowing. Eyes: Negative for discharge, itching and visual disturbance. Respiratory: Negative for cough, choking, shortness of breath, wheezing and stridor. Cardiovascular: Negative for chest pain, palpitations and leg swelling. Gastrointestinal: Negative for abdominal distention, abdominal pain, blood in stool, constipation, diarrhea, nausea and vomiting. Endocrine: Negative for cold intolerance, polydipsia and polyuria. Genitourinary: Negative for difficulty urinating, dysuria, frequency and urgency. Musculoskeletal: Positive for arthralgias. Negative for gait problem. Has gout    Skin: Negative for color change and rash. Allergic/Immunologic: Negative for food allergies and immunocompromised state.    Neurological: Negative for dizziness, tremors, syncope, speech difficulty, weakness and headaches. Hematological: Negative for adenopathy. Does not bruise/bleed easily. Psychiatric/Behavioral: Negative for confusion and hallucinations. Objective:     Physical Exam   Constitutional: He is oriented to person, place, and time. He appears well-developed and well-nourished. No distress. HENT:   Head: Normocephalic and atraumatic. Eyes: Pupils are equal, round, and reactive to light. Right eye exhibits no discharge. Left eye exhibits no discharge. No scleral icterus. Neck: Normal range of motion. Neck supple. No JVD present. No thyromegaly present. Cardiovascular: Normal rate, regular rhythm and normal heart sounds. No murmur heard. Pulmonary/Chest: Effort normal and breath sounds normal. No respiratory distress. He has no wheezes. He has no rales. Abdominal: Soft. Bowel sounds are normal. He exhibits no distension and no mass. There is no tenderness. There is no rebound and no guarding. Musculoskeletal: Normal range of motion. He exhibits no edema or tenderness. Neurological: He is alert and oriented to person, place, and time. He has normal reflexes. He displays normal reflexes. No cranial nerve deficit. Coordination normal.   Skin: Skin is warm and dry. No rash noted. No erythema. Psychiatric: His behavior is normal. Judgment and thought content normal. He does not exhibit a depressed mood. BP (!) 122/90   Pulse 114   Resp 18   Ht 5' 11\" (1.803 m)   Wt 194 lb (88 kg)   SpO2 98%   BMI 27.06 kg/m²     Assessment:       Diagnosis Orders   1. Arthralgia, unspecified joint  Uric Acid   2. Arthritis of left hip due to other bacteria ([facility] [address]     3. Chronic back pain, unspecified back location, unspecified back pain laterality     4. Secondary hypertension     5.  Healthcare maintenance  CBC Auto Differential    Comprehensive Metabolic Panel    Lipid Panel    Vitamin D 25 Hydroxy    TSH without Reflex    Psa screening    Hemoglobin A1C Uric Acid     Labs reviewedfrom today     Plan:        Patient given educational materials - see patient instructions. Discussed use, benefit, and side effects of prescribed medications. Allpatient questions answered. Pt voiced understanding. Reviewed health maintenance. Instructed to continue current medications, diet and exercise. Patient agreed with treatment plan. Follow up as directed. MEDICATIONS:  Orders Placed This Encounter   Medications    methocarbamol (ROBAXIN) 500 MG tablet     Sig: Take 1 tablet by mouth 4 times daily for 10 days     Dispense:  40 tablet     Refill:  0    rOPINIRole (REQUIP) 0.5 MG tablet     Sig: Take 1 tablet by mouth 3 times daily     Dispense:  90 tablet     Refill:  3         ORDERS:  Orders Placed This Encounter   Procedures    Uric Acid    CBC Auto Differential    Comprehensive Metabolic Panel    Lipid Panel    Vitamin D 25 Hydroxy    TSH without Reflex    Psa screening    Hemoglobin A1C    Uric Acid       Follow-up:  No follow-ups on file. PATIENT INSTRUCTIONS:  Patient Instructions   1  HTN. Increase lisinopril 5 mg to 10 mg daily;    2  RLS;  Start requip at bedtime; Take for about a week  Then slowly increase to 3 times a day    3. Chronic back pain;  Uses robaxin prn;   4.  Splenectomy  In the teens; Stable   5. MRSA    stble for now; If you have any infections that worsen, let me know;      Electronically signed by DEMETRIS Berry on 5/14/2019 at 10:09 AM    EMRDragon/transcription disclaimer:  Much of this encounter note is electronic transcription/translation of spoken language to printed texts. The electronic translation of spoken language may be erroneous, or at times,nonsensical words or phrases may be inadvertently transcribed.   Although I have reviewed the note for such errors, some may still exist.

## 2019-05-14 NOTE — PATIENT INSTRUCTIONS
1  HTN. Increase lisinopril 5 mg to 10 mg daily;    2  RLS;  Start requip at bedtime; Take for about a week  Then slowly increase to 3 times a day    3. Chronic back pain;  Uses robaxin prn;   4.  Splenectomy  In the teens; Stable   5. MRSA    stble for now;   If you have any infections that worsen, let me know;

## 2019-05-28 ENCOUNTER — TELEPHONE (OUTPATIENT)
Dept: INTERNAL MEDICINE | Age: 54
End: 2019-05-28

## 2019-05-28 ENCOUNTER — TELEPHONE (OUTPATIENT)
Dept: URGENT CARE | Facility: CLINIC | Age: 54
End: 2019-05-28

## 2019-05-28 RX ORDER — LISINOPRIL 5 MG/1
5 TABLET ORAL DAILY
Qty: 90 TABLET | Refills: 2 | Status: SHIPPED | OUTPATIENT
Start: 2019-05-28 | End: 2019-08-01 | Stop reason: SDUPTHER

## 2019-05-28 NOTE — TELEPHONE ENCOUNTER
Pt called stating that he tried to call PCP to get a refill of lisinopril.  Pt stated that Dr. Magallon's office was closed when he called.  I called Dr. Magallon's office at [phone] and talked to a .  I called that patient back and advised that office is currently open.    Miguelina Bueno RN 5/28/2019 8:17 AM

## 2019-05-28 NOTE — TELEPHONE ENCOUNTER
Omari called requesting a refill of the below medication which has been pended for you:     Requested Prescriptions      No prescriptions requested or ordered in this encounter       Last Appointment Date: 5/14/2019  Next Appointment Date: 8/14/2019    Allergies   Allergen Reactions    Aspirin      Pt has tolerated ASA in the past as well as other NSAIDs    Morphine     Penicillins

## 2019-06-14 ENCOUNTER — HOSPITAL ENCOUNTER (OUTPATIENT)
Dept: INFUSION THERAPY | Age: 54
Discharge: HOME | End: 2019-06-14

## 2019-06-14 PROCEDURE — 85025 COMPLETE CBC W/AUTO DIFF WBC: CPT

## 2019-06-23 ENCOUNTER — HOSPITAL ENCOUNTER (OUTPATIENT)
Age: 54
Setting detail: OBSERVATION
Discharge: HOME | End: 2019-06-24
Attending: SURGERY | Admitting: SURGERY
Payer: COMMERCIAL

## 2019-06-23 DIAGNOSIS — T18.5XXA FOREIGN BODY OF RECTUM, INITIAL ENCOUNTER: Primary | ICD-10-CM

## 2019-06-23 PROCEDURE — 99285 EMERGENCY DEPT VISIT HI MDM: CPT

## 2019-06-24 ENCOUNTER — APPOINTMENT (OUTPATIENT)
Dept: GENERAL RADIOLOGY | Age: 54
End: 2019-06-24

## 2019-06-24 ENCOUNTER — ANESTHESIA EVENT (OUTPATIENT)
Dept: OPERATING ROOM | Age: 54
End: 2019-06-24

## 2019-06-24 ENCOUNTER — ANESTHESIA (OUTPATIENT)
Dept: OPERATING ROOM | Age: 54
End: 2019-06-24

## 2019-06-24 VITALS
DIASTOLIC BLOOD PRESSURE: 80 MMHG | RESPIRATION RATE: 18 BRPM | HEIGHT: 71 IN | WEIGHT: 200 LBS | HEART RATE: 73 BPM | OXYGEN SATURATION: 99 % | TEMPERATURE: 97.6 F | SYSTOLIC BLOOD PRESSURE: 139 MMHG | BODY MASS INDEX: 28 KG/M2

## 2019-06-24 VITALS
SYSTOLIC BLOOD PRESSURE: 89 MMHG | RESPIRATION RATE: 7 BRPM | OXYGEN SATURATION: 99 % | DIASTOLIC BLOOD PRESSURE: 61 MMHG

## 2019-06-24 LAB
ANION GAP SERPL CALCULATED.3IONS-SCNC: 9 MMOL/L (ref 7–19)
APTT: 27.4 SEC (ref 26–36.2)
ATYPICAL LYMPHOCYTE RELATIVE PERCENT: 2 % (ref 0–8)
BASOPHILS ABSOLUTE: 0.2 K/UL (ref 0–0.2)
BASOPHILS RELATIVE PERCENT: 1 % (ref 0–1)
BUN BLDV-MCNC: 17 MG/DL (ref 6–20)
CALCIUM SERPL-MCNC: 9.3 MG/DL (ref 8.6–10)
CHLORIDE BLD-SCNC: 103 MMOL/L (ref 98–111)
CO2: 29 MMOL/L (ref 22–29)
CREAT SERPL-MCNC: 0.7 MG/DL (ref 0.5–1.2)
EOSINOPHILS ABSOLUTE: 0.16 K/UL (ref 0–0.6)
EOSINOPHILS RELATIVE PERCENT: 1 % (ref 0–5)
GFR NON-AFRICAN AMERICAN: >60
GLUCOSE BLD-MCNC: 118 MG/DL (ref 74–109)
HCT VFR BLD CALC: 46.5 % (ref 42–52)
HEMOGLOBIN: 15.5 G/DL (ref 14–18)
INR BLD: 1.04 (ref 0.88–1.18)
LYMPHOCYTES ABSOLUTE: 5 K/UL (ref 1.1–4.5)
LYMPHOCYTES RELATIVE PERCENT: 30 % (ref 20–40)
MCH RBC QN AUTO: 30.8 PG (ref 27–31)
MCHC RBC AUTO-ENTMCNC: 33.3 G/DL (ref 33–37)
MCV RBC AUTO: 92.3 FL (ref 80–94)
MONOCYTES ABSOLUTE: 0.9 K/UL (ref 0–0.9)
MONOCYTES RELATIVE PERCENT: 6 % (ref 0–10)
NEUTROPHILS ABSOLUTE: 9.4 K/UL (ref 1.5–7.5)
NEUTROPHILS RELATIVE PERCENT: 60 % (ref 50–65)
PDW BLD-RTO: 13.6 % (ref 11.5–14.5)
PLATELET # BLD: 504 K/UL (ref 130–400)
PLATELET SLIDE REVIEW: ABNORMAL
PMV BLD AUTO: 8.3 FL (ref 9.4–12.4)
POTASSIUM SERPL-SCNC: 3.7 MMOL/L (ref 3.5–5)
PROTHROMBIN TIME: 13 SEC (ref 12–14.6)
RBC # BLD: 5.04 M/UL (ref 4.7–6.1)
RBC # BLD: NORMAL 10*6/UL
SODIUM BLD-SCNC: 141 MMOL/L (ref 136–145)
WBC # BLD: 15.6 K/UL (ref 4.8–10.8)

## 2019-06-24 PROCEDURE — 7100000000 HC PACU RECOVERY - FIRST 15 MIN: Performed by: SURGERY

## 2019-06-24 PROCEDURE — 2500000003 HC RX 250 WO HCPCS: Performed by: NURSE ANESTHETIST, CERTIFIED REGISTERED

## 2019-06-24 PROCEDURE — 85610 PROTHROMBIN TIME: CPT

## 2019-06-24 PROCEDURE — 7100000001 HC PACU RECOVERY - ADDTL 15 MIN: Performed by: SURGERY

## 2019-06-24 PROCEDURE — 85730 THROMBOPLASTIN TIME PARTIAL: CPT

## 2019-06-24 PROCEDURE — 2709999900 HC NON-CHARGEABLE SUPPLY: Performed by: SURGERY

## 2019-06-24 PROCEDURE — 3700000000 HC ANESTHESIA ATTENDED CARE: Performed by: SURGERY

## 2019-06-24 PROCEDURE — G0378 HOSPITAL OBSERVATION PER HR: HCPCS

## 2019-06-24 PROCEDURE — 74018 RADEX ABDOMEN 1 VIEW: CPT

## 2019-06-24 PROCEDURE — 3600000014 HC SURGERY LEVEL 4 ADDTL 15MIN: Performed by: SURGERY

## 2019-06-24 PROCEDURE — 6360000002 HC RX W HCPCS: Performed by: NURSE ANESTHETIST, CERTIFIED REGISTERED

## 2019-06-24 PROCEDURE — 3600000004 HC SURGERY LEVEL 4 BASE: Performed by: SURGERY

## 2019-06-24 PROCEDURE — 85025 COMPLETE CBC W/AUTO DIFF WBC: CPT

## 2019-06-24 PROCEDURE — 45915 REMOVE RECTAL OBSTRUCTION: CPT | Performed by: SURGERY

## 2019-06-24 PROCEDURE — 99218 PR INITIAL OBSERVATION CARE/DAY 30 MINUTES: CPT | Performed by: SURGERY

## 2019-06-24 PROCEDURE — 36415 COLL VENOUS BLD VENIPUNCTURE: CPT

## 2019-06-24 PROCEDURE — 80048 BASIC METABOLIC PNL TOTAL CA: CPT

## 2019-06-24 PROCEDURE — 88300 SURGICAL PATH GROSS: CPT

## 2019-06-24 PROCEDURE — 3700000001 HC ADD 15 MINUTES (ANESTHESIA): Performed by: SURGERY

## 2019-06-24 PROCEDURE — 2580000003 HC RX 258: Performed by: NURSE ANESTHETIST, CERTIFIED REGISTERED

## 2019-06-24 RX ORDER — SODIUM CHLORIDE 0.9 % (FLUSH) 0.9 %
10 SYRINGE (ML) INJECTION PRN
Status: DISCONTINUED | OUTPATIENT
Start: 2019-06-24 | End: 2019-06-24 | Stop reason: HOSPADM

## 2019-06-24 RX ORDER — LIDOCAINE HYDROCHLORIDE 10 MG/ML
1 INJECTION, SOLUTION EPIDURAL; INFILTRATION; INTRACAUDAL; PERINEURAL
Status: DISCONTINUED | OUTPATIENT
Start: 2019-06-24 | End: 2019-06-24 | Stop reason: HOSPADM

## 2019-06-24 RX ORDER — SODIUM CHLORIDE, SODIUM LACTATE, POTASSIUM CHLORIDE, CALCIUM CHLORIDE 600; 310; 30; 20 MG/100ML; MG/100ML; MG/100ML; MG/100ML
INJECTION, SOLUTION INTRAVENOUS CONTINUOUS PRN
Status: DISCONTINUED | OUTPATIENT
Start: 2019-06-24 | End: 2019-06-24 | Stop reason: SDUPTHER

## 2019-06-24 RX ORDER — METOCLOPRAMIDE HYDROCHLORIDE 5 MG/ML
10 INJECTION INTRAMUSCULAR; INTRAVENOUS
Status: DISCONTINUED | OUTPATIENT
Start: 2019-06-24 | End: 2019-06-24 | Stop reason: HOSPADM

## 2019-06-24 RX ORDER — ONDANSETRON 2 MG/ML
INJECTION INTRAMUSCULAR; INTRAVENOUS PRN
Status: DISCONTINUED | OUTPATIENT
Start: 2019-06-24 | End: 2019-06-24 | Stop reason: SDUPTHER

## 2019-06-24 RX ORDER — MIDAZOLAM HYDROCHLORIDE 1 MG/ML
INJECTION INTRAMUSCULAR; INTRAVENOUS PRN
Status: DISCONTINUED | OUTPATIENT
Start: 2019-06-24 | End: 2019-06-24 | Stop reason: SDUPTHER

## 2019-06-24 RX ORDER — SCOLOPAMINE TRANSDERMAL SYSTEM 1 MG/1
1 PATCH, EXTENDED RELEASE TRANSDERMAL ONCE
Status: DISCONTINUED | OUTPATIENT
Start: 2019-06-24 | End: 2019-06-24 | Stop reason: HOSPADM

## 2019-06-24 RX ORDER — HYDRALAZINE HYDROCHLORIDE 20 MG/ML
5 INJECTION INTRAMUSCULAR; INTRAVENOUS EVERY 10 MIN PRN
Status: DISCONTINUED | OUTPATIENT
Start: 2019-06-24 | End: 2019-06-24 | Stop reason: HOSPADM

## 2019-06-24 RX ORDER — MEPERIDINE HYDROCHLORIDE 50 MG/ML
12.5 INJECTION INTRAMUSCULAR; INTRAVENOUS; SUBCUTANEOUS EVERY 5 MIN PRN
Status: DISCONTINUED | OUTPATIENT
Start: 2019-06-24 | End: 2019-06-24 | Stop reason: HOSPADM

## 2019-06-24 RX ORDER — FENTANYL CITRATE 50 UG/ML
50 INJECTION, SOLUTION INTRAMUSCULAR; INTRAVENOUS
Status: DISCONTINUED | OUTPATIENT
Start: 2019-06-24 | End: 2019-06-24 | Stop reason: HOSPADM

## 2019-06-24 RX ORDER — FENTANYL CITRATE 50 UG/ML
INJECTION, SOLUTION INTRAMUSCULAR; INTRAVENOUS PRN
Status: DISCONTINUED | OUTPATIENT
Start: 2019-06-24 | End: 2019-06-24 | Stop reason: SDUPTHER

## 2019-06-24 RX ORDER — PROPOFOL 10 MG/ML
INJECTION, EMULSION INTRAVENOUS PRN
Status: DISCONTINUED | OUTPATIENT
Start: 2019-06-24 | End: 2019-06-24 | Stop reason: SDUPTHER

## 2019-06-24 RX ORDER — DIPHENHYDRAMINE HYDROCHLORIDE 50 MG/ML
12.5 INJECTION INTRAMUSCULAR; INTRAVENOUS
Status: DISCONTINUED | OUTPATIENT
Start: 2019-06-24 | End: 2019-06-24 | Stop reason: HOSPADM

## 2019-06-24 RX ORDER — LABETALOL 20 MG/4 ML (5 MG/ML) INTRAVENOUS SYRINGE
5 EVERY 10 MIN PRN
Status: DISCONTINUED | OUTPATIENT
Start: 2019-06-24 | End: 2019-06-24 | Stop reason: HOSPADM

## 2019-06-24 RX ORDER — DOCUSATE SODIUM 100 MG/1
100 CAPSULE, LIQUID FILLED ORAL DAILY PRN
Qty: 30 CAPSULE | Refills: 0 | Status: SHIPPED | OUTPATIENT
Start: 2019-06-24 | End: 2019-10-06 | Stop reason: ALTCHOICE

## 2019-06-24 RX ORDER — PROMETHAZINE HYDROCHLORIDE 25 MG/ML
6.25 INJECTION, SOLUTION INTRAMUSCULAR; INTRAVENOUS
Status: DISCONTINUED | OUTPATIENT
Start: 2019-06-24 | End: 2019-06-24 | Stop reason: HOSPADM

## 2019-06-24 RX ORDER — LIDOCAINE HYDROCHLORIDE 10 MG/ML
INJECTION, SOLUTION EPIDURAL; INFILTRATION; INTRACAUDAL; PERINEURAL PRN
Status: DISCONTINUED | OUTPATIENT
Start: 2019-06-24 | End: 2019-06-24 | Stop reason: SDUPTHER

## 2019-06-24 RX ORDER — SODIUM CHLORIDE 0.9 % (FLUSH) 0.9 %
10 SYRINGE (ML) INJECTION EVERY 12 HOURS SCHEDULED
Status: DISCONTINUED | OUTPATIENT
Start: 2019-06-24 | End: 2019-06-24 | Stop reason: HOSPADM

## 2019-06-24 RX ORDER — MIDAZOLAM HYDROCHLORIDE 1 MG/ML
2 INJECTION INTRAMUSCULAR; INTRAVENOUS
Status: DISCONTINUED | OUTPATIENT
Start: 2019-06-24 | End: 2019-06-24 | Stop reason: HOSPADM

## 2019-06-24 RX ORDER — ROCURONIUM BROMIDE 10 MG/ML
INJECTION, SOLUTION INTRAVENOUS PRN
Status: DISCONTINUED | OUTPATIENT
Start: 2019-06-24 | End: 2019-06-24 | Stop reason: SDUPTHER

## 2019-06-24 RX ORDER — DEXAMETHASONE SODIUM PHOSPHATE 10 MG/ML
INJECTION INTRAMUSCULAR; INTRAVENOUS PRN
Status: DISCONTINUED | OUTPATIENT
Start: 2019-06-24 | End: 2019-06-24 | Stop reason: SDUPTHER

## 2019-06-24 RX ORDER — SODIUM CHLORIDE, SODIUM LACTATE, POTASSIUM CHLORIDE, CALCIUM CHLORIDE 600; 310; 30; 20 MG/100ML; MG/100ML; MG/100ML; MG/100ML
INJECTION, SOLUTION INTRAVENOUS CONTINUOUS
Status: DISCONTINUED | OUTPATIENT
Start: 2019-06-24 | End: 2019-06-24 | Stop reason: HOSPADM

## 2019-06-24 RX ADMIN — MIDAZOLAM 2 MG: 1 INJECTION INTRAMUSCULAR; INTRAVENOUS at 01:48

## 2019-06-24 RX ADMIN — FENTANYL CITRATE 50 MCG: 50 INJECTION INTRAMUSCULAR; INTRAVENOUS at 01:48

## 2019-06-24 RX ADMIN — ROCURONIUM BROMIDE 25 MG: 10 INJECTION INTRAVENOUS at 01:52

## 2019-06-24 RX ADMIN — ONDANSETRON HYDROCHLORIDE 4 MG: 2 INJECTION, SOLUTION INTRAMUSCULAR; INTRAVENOUS at 02:13

## 2019-06-24 RX ADMIN — DEXAMETHASONE SODIUM PHOSPHATE 10 MG: 10 INJECTION INTRAMUSCULAR; INTRAVENOUS at 01:56

## 2019-06-24 RX ADMIN — SODIUM CHLORIDE, SODIUM LACTATE, POTASSIUM CHLORIDE, AND CALCIUM CHLORIDE: 600; 310; 30; 20 INJECTION, SOLUTION INTRAVENOUS at 01:48

## 2019-06-24 RX ADMIN — PHENYLEPHRINE HYDROCHLORIDE 80 MCG: 10 INJECTION INTRAVENOUS at 01:59

## 2019-06-24 RX ADMIN — ROCURONIUM BROMIDE 5 MG: 10 INJECTION INTRAVENOUS at 01:48

## 2019-06-24 RX ADMIN — PHENYLEPHRINE HYDROCHLORIDE 80 MCG: 10 INJECTION INTRAVENOUS at 01:56

## 2019-06-24 RX ADMIN — LIDOCAINE HYDROCHLORIDE 50 MG: 10 INJECTION, SOLUTION EPIDURAL; INFILTRATION; INTRACAUDAL; PERINEURAL at 01:48

## 2019-06-24 RX ADMIN — PROPOFOL 180 MG: 10 INJECTION, EMULSION INTRAVENOUS at 01:48

## 2019-06-24 RX ADMIN — SUGAMMADEX 300 MG: 100 INJECTION, SOLUTION INTRAVENOUS at 02:12

## 2019-06-24 ASSESSMENT — ENCOUNTER SYMPTOMS
SHORTNESS OF BREATH: 0
SHORTNESS OF BREATH: 0
CHEST TIGHTNESS: 0
SORE THROAT: 0
CONSTIPATION: 1
BACK PAIN: 0
COLOR CHANGE: 0
COUGH: 1
DIARRHEA: 0
EYE PAIN: 0
ABDOMINAL DISTENTION: 0
ABDOMINAL PAIN: 0
RECTAL PAIN: 1
NAUSEA: 0
VOMITING: 0
EYE REDNESS: 0

## 2019-06-24 ASSESSMENT — LIFESTYLE VARIABLES: SMOKING_STATUS: 0

## 2019-06-24 ASSESSMENT — PAIN SCALES - GENERAL
PAINLEVEL_OUTOF10: 0
PAINLEVEL_OUTOF10: 0

## 2019-06-24 NOTE — ED NOTES
Pt going to surgery. Attempted to remove jewelry. Patient was able to remove 1 ring. Ring placed in left pocket of jeans. Pt aware and verbalized understanding. Patient unable to remove 2nd ring and necklace.       Shoshana Mack RN  06/24/19 7150

## 2019-06-24 NOTE — PROGRESS NOTES
Patient resting well and patient notified he will be staying till his ride is available in am to pick him up and that he cannot drive himself home.   Patient understands and will cont to monitor  Electronically signed by Arnie Sood RN on 6/24/2019 at 3:45 AM

## 2019-06-24 NOTE — PROGRESS NOTES
Pt presented to PACU with ABD pad on rectum. Pad has red small bloody drainage; unremarkable and expected per surgeon. Pt to [facility] for discharge. Electronically signed by Jr Cordero RN on 6/24/2019 at 2:53 AM  Pt has no family to take him home wants to stay until tomorrow morning in order to get a ride home. Pt states ' I thought I could drive myself home after surgery'. Pt was informed on why that was no possible by 3 Rns.    Electronically signed by Jr Cordero RN on 6/24/2019 at 3:21 AM

## 2019-06-24 NOTE — PROGRESS NOTES
Patient iv dc'd and dc papers completed and signed, patient significant other on her way to pick him up Electronically signed by Junior Christiansen RN on 6/24/2019 at 6:12 AM

## 2019-06-24 NOTE — ED NOTES
Dr Zeeshan Retana and Jammie Wooten NP at bedside attempting to remove foreign body. Pt tolerated well. Retrieval unsuccessful.       Jocelyne Zavala RN  06/24/19 5699
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
## 2019-06-24 NOTE — ANESTHESIA POSTPROCEDURE EVALUATION
Department of Anesthesiology  Postprocedure Note    Patient: Marcella Ortega  MRN: [id number]  YOB: 1965  Date of evaluation: 6/24/2019  Time:  2:25 AM     Procedure Summary     Date:  06/24/19 Room / Location:  [facility] OR  / [facility] OR    Anesthesia Start:  0139 Anesthesia Stop:      Procedure:  RECTAL FOREIGN BODY REMOVAL (N/A ) Diagnosis:  (foreign body in rectum)    Surgeon:  Pietro Castillo DO Responsible Provider:  DEMETRIS Bay CRNA    Anesthesia Type:  general ASA Status:  2 - Emergent          Anesthesia Type: general    Darwin Phase I: Darwin Score: 10    Darwin Phase II:      Last vitals: Reviewed and per EMR flowsheets.        Anesthesia Post Evaluation    Patient location during evaluation: PACU  Patient participation: complete - patient participated  Level of consciousness: awake and alert  Pain score: 0  Airway patency: patent  Nausea & Vomiting: no nausea and no vomiting  Complications: no  Cardiovascular status: blood pressure returned to baseline  Respiratory status: acceptable, spontaneous ventilation and room air  Hydration status: stable

## 2019-06-24 NOTE — OP NOTE
SURGICAL DEPARTMENT REPORT    NAME OF SURGEON/:  Mel Davis DO    DATE OF SERVICE: 6/24/2019    PREOPERATIVE DIAGNOSIS  Rectal Foreign Body    POSTOPERATIVE DIAGNOSIS  Rectal Foreign Body    PROCEDURE  Exam under anesthesia with removal of rectal foreign body    SURGEON  Mel Davis DO    INDICATIONS  Mr. Danie Bryan is a 63 yo male who presented to the emergency room with complaints of rectal foreign body which has been stuck for 2 days. He had been unable to pass this object, and he is unsure of what it is. Risks, benefits, and alternatives were discussed and he proceeded with attempted removal.    PROCEDURE  Patient was taken to the main operating room, placed on the operating table in lithotomy position. Tthe patient was placed under general endotracheal anesthesia. The rectum was prepped and draped in the usual sterile fashion. A timeout was performed identifying the correct patient and equipment present. A hand was placed into the rectum and the object was palpated. Pressure was applied from the abdomen in a downward direction, and the object was able to be grasped circumferentially and removed. Sponge, needle, instrument count correct on 2 occasions. Estimated  intraoperative blood loss 5 mL. Mr. Danie Bryan tolerated his surgery well and he was taken to PACU in satisfactory condition.           ________________________________  Mel Davis DO
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
## 2019-06-24 NOTE — ANESTHESIA PRE PROCEDURE
Department of Anesthesiology  Preprocedure Note       Name:  Avtar Granados   Age:  62 y.o.  :  1965                                          MRN:  [id number]         Date:  2019      Surgeon: Jeannie Oliveira):  Jeana Lacey DO    Procedure: RECTAL FOREIGN BODY REMOVAL (N/A )    Medications prior to admission:   Prior to Admission medications    Medication Sig Start Date End Date Taking? Authorizing Provider   lisinopril (PRINIVIL;ZESTRIL) 5 MG tablet Take 1 tablet by mouth daily 19  Yes DEMETRIS Hartman   colchicine (COLCRYS) 0.6 MG tablet Take 0.6 mg by mouth daily   Yes Historical Provider, MD   APPLE CIDER VINEGAR PO Take by mouth   Yes Historical Provider, MD   Potassium 99 MG TABS Take by mouth   Yes Historical Provider, MD   Misc Natural Products (TART CHERRY ADVANCED PO) Take by mouth   Yes Historical Provider, MD   rOPINIRole (REQUIP) 0.5 MG tablet Take 1 tablet by mouth 3 times daily 19  Yes DEMETRIS Hartman   simvastatin (ZOCOR) 20 MG tablet Take 1 tablet by mouth nightly 19  Yes DEMETRIS Hartman   ondansetron (ZOFRAN ODT) 4 MG disintegrating tablet Take 1 tablet by mouth every 8 hours as needed for Nausea or Vomiting 18  Yes Anila Huff MD       Current medications:    No current facility-administered medications for this encounter.       Current Outpatient Medications   Medication Sig Dispense Refill    lisinopril (PRINIVIL;ZESTRIL) 5 MG tablet Take 1 tablet by mouth daily 90 tablet 2    colchicine (COLCRYS) 0.6 MG tablet Take 0.6 mg by mouth daily      APPLE CIDER VINEGAR PO Take by mouth      Potassium 99 MG TABS Take by mouth      Misc Natural Products (TART CHERRY ADVANCED PO) Take by mouth      rOPINIRole (REQUIP) 0.5 MG tablet Take 1 tablet by mouth 3 times daily 90 tablet 3    simvastatin (ZOCOR) 20 MG tablet Take 1 tablet by mouth nightly 90 tablet 1    ondansetron (ZOFRAN ODT) 4 MG disintegrating tablet Take 1 tablet by mouth every 8 hours as needed for Nausea or Vomiting 15 tablet 0       Allergies: Allergies   Allergen Reactions    Aspirin      Pt has tolerated ASA in the past as well as other NSAIDs    Morphine     Penicillins        Problem List:    Patient Active Problem List   Diagnosis Code    Hypertension I10    Arthritis M19.90    Septic arthritis ([address] M00.9    Back pain, chronic M54.9, G89.29    Insomnia G47.00    Septic arthritis of right ankle ([address] M00.9    Rectal foreign body T18. 5XXA    Rectal foreign body, initial encounter T18. Brennon       Past Medical History:        Diagnosis Date    Arthritis     Back pain     History of blood transfusion     Hypertension        Past Surgical History:        Procedure Laterality Date    ABDOMEN SURGERY      APPENDECTOMY      FRACTURE SURGERY      SPLENECTOMY      SPLENECTOMY N/A 1981    ? Social History:    Social History     Tobacco Use    Smoking status: Current Every Day Smoker     Packs/day: 1.00     Years: 41.00     Pack years: 41.00     Types: Cigarettes    Smokeless tobacco: Current User   Substance Use Topics    Alcohol use: No                                Ready to quit: Not Answered  Counseling given: Not Answered      Vital Signs (Current):   Vitals:    06/24/19 0000 06/24/19 0126   BP: (!) 142/88 125/85   Pulse: 80 77   Resp: 20 18   Temp: 97.9 °F (36.6 °C) 97.4 °F (36.3 °C)   TempSrc: Oral Oral   SpO2: 96% 98%   Weight: 200 lb (90.7 kg)    Height: 5' 11\" (1.803 m)                                               BP Readings from Last 3 Encounters:   06/24/19 125/85   05/14/19 (!) 122/90   08/23/18 137/78       NPO Status:                                                                                 BMI:   Wt Readings from Last 3 Encounters:   06/24/19 200 lb (90.7 kg)   05/14/19 194 lb (88 kg)   08/23/18 180 lb (81.6 kg)     Body mass index is 27.89 kg/m².     CBC:   Lab Results   Component Value Date    WBC 15.6 06/24/2019    RBC 5.04 06/24/2019    HGB 15.5 06/24/2019    HCT 46.5 06/24/2019    HCT 45.9 03/07/2011    MCV 92.3 06/24/2019    RDW 13.6 06/24/2019     06/24/2019     03/07/2011       CMP:   Lab Results   Component Value Date     05/14/2019     03/07/2011    K 4.7 05/14/2019    K 4.5 03/07/2011     05/14/2019     03/07/2011    CO2 25 05/14/2019    BUN 26 05/14/2019    CREATININE 0.8 05/14/2019    CREATININE 0.8 03/07/2011    LABGLOM >60 05/14/2019    GLUCOSE 98 05/14/2019    PROT 8.2 05/14/2019    PROT 6.9 03/07/2011    CALCIUM 10.0 05/14/2019    BILITOT 0.3 05/14/2019    ALKPHOS 99 05/14/2019    ALKPHOS 79 03/07/2011    AST 27 05/14/2019    ALT 39 05/14/2019       POC Tests: No results for input(s): POCGLU, POCNA, POCK, POCCL, POCBUN, POCHEMO, POCHCT in the last 72 hours. Coags:   Lab Results   Component Value Date    PROTIME 13.0 06/24/2019    INR 1.04 06/24/2019    APTT 27.4 06/24/2019       HCG (If Applicable): No results found for: PREGTESTUR, PREGSERUM, HCG, HCGQUANT     ABGs:   Lab Results   Component Value Date    PHART 7.480 08/23/2018    PO2ART 74.0 08/23/2018    LOT0GVO 36.0 08/23/2018    FYB3IVO 26.8 08/23/2018    BEART 3.5 08/23/2018    T4RBJPKS 91.4 08/23/2018        Type & Screen (If Applicable):  No results found for: LABABO, [address]    Anesthesia Evaluation  Patient summary reviewed and Nursing notes reviewed no history of anesthetic complications:   Airway: Mallampati: II  TM distance: >3 FB   Neck ROM: full  Mouth opening: > = 3 FB Dental: normal exam         Pulmonary:Negative Pulmonary ROS and normal exam  breath sounds clear to auscultation      (-) shortness of breath and not a current smoker          Patient did not smoke on day of surgery.                  Cardiovascular:    (+) hypertension:,     (-) CAD,  angina and  CHF    NYHA Classification: I  ECG reviewed  Rhythm: regular  Rate: normal           Beta Blocker:  Not on Beta Blocker         Neuro/Psych:   Negative Neuro/Psych ROS     (-)

## 2019-06-25 ENCOUNTER — TELEPHONE (OUTPATIENT)
Dept: INTERNAL MEDICINE | Age: 54
End: 2019-06-25

## 2019-07-08 ENCOUNTER — TELEPHONE (OUTPATIENT)
Dept: INTERNAL MEDICINE | Age: 54
End: 2019-07-08

## 2019-07-08 RX ORDER — INDOMETHACIN 25 MG/1
25 CAPSULE ORAL 3 TIMES DAILY
Qty: 21 CAPSULE | Refills: 1 | Status: SHIPPED | OUTPATIENT
Start: 2019-07-08 | End: 2019-10-16 | Stop reason: ALTCHOICE

## 2019-08-01 RX ORDER — LISINOPRIL 5 MG/1
5 TABLET ORAL DAILY
Qty: 90 TABLET | Refills: 2 | Status: SHIPPED | OUTPATIENT
Start: 2019-08-01 | End: 2019-08-13

## 2019-08-01 NOTE — TELEPHONE ENCOUNTER
Donato Greene is requesting a refill of their   Requested Prescriptions     Pending Prescriptions Disp Refills    lisinopril (PRINIVIL;ZESTRIL) 5 MG tablet 90 tablet 2     Sig: Take 1 tablet by mouth daily   . Please advise.       Last Appt:  5/14/2019  Next Appt:   8/13/2019  [facility]

## 2019-08-13 ENCOUNTER — OFFICE VISIT (OUTPATIENT)
Dept: INTERNAL MEDICINE | Age: 54
End: 2019-08-13
Payer: COMMERCIAL

## 2019-08-13 VITALS
HEART RATE: 101 BPM | WEIGHT: 195 LBS | DIASTOLIC BLOOD PRESSURE: 74 MMHG | HEIGHT: 71 IN | BODY MASS INDEX: 27.3 KG/M2 | OXYGEN SATURATION: 97 % | RESPIRATION RATE: 18 BRPM | SYSTOLIC BLOOD PRESSURE: 116 MMHG

## 2019-08-13 DIAGNOSIS — Z00.00 HEALTHCARE MAINTENANCE: ICD-10-CM

## 2019-08-13 DIAGNOSIS — D75.839 THROMBOCYTOSIS: ICD-10-CM

## 2019-08-13 DIAGNOSIS — M54.2 NECK PAIN: ICD-10-CM

## 2019-08-13 DIAGNOSIS — M10.09 ACUTE IDIOPATHIC GOUT OF MULTIPLE SITES: ICD-10-CM

## 2019-08-13 DIAGNOSIS — I15.9 SECONDARY HYPERTENSION: Primary | ICD-10-CM

## 2019-08-13 DIAGNOSIS — G25.81 RESTLESS LEG SYNDROME: ICD-10-CM

## 2019-08-13 PROCEDURE — 99214 OFFICE O/P EST MOD 30 MIN: CPT | Performed by: NURSE PRACTITIONER

## 2019-08-13 RX ORDER — LISINOPRIL 10 MG/1
10 TABLET ORAL DAILY
Qty: 90 TABLET | Refills: 0 | Status: SHIPPED | OUTPATIENT
Start: 2019-08-13 | End: 2019-12-04 | Stop reason: ALTCHOICE

## 2019-08-13 ASSESSMENT — ENCOUNTER SYMPTOMS
COLOR CHANGE: 0
WHEEZING: 0
ABDOMINAL PAIN: 0
ABDOMINAL DISTENTION: 0
VOMITING: 0
NAUSEA: 0
DIARRHEA: 0
SORE THROAT: 0
BLOOD IN STOOL: 0
EYE ITCHING: 0
TROUBLE SWALLOWING: 0
CONSTIPATION: 0
STRIDOR: 0
BACK PAIN: 1
SHORTNESS OF BREATH: 0
EYE DISCHARGE: 0
COUGH: 1
CHOKING: 0

## 2019-08-13 NOTE — PATIENT INSTRUCTIONS
1.  Hypertension I have sent a new prescription for lisinopril 10 mg daily to use at the 5's point; we want to run by some time with blood pressure monitor we will be happy to double check it for you  2. Start aspirin 81 mg daily  3. Gout you have the Indocin if you have another acute flare of the gout  4. Restless leg syndrome if 1 or 2 Requip is doing the job there is no need to take 3  5.   Left arm and neck pain try taking half of the cyclobenzaprine during the daytime and then take a whole one in the evening and at night especially before you go to bed

## 2019-09-03 RX ORDER — ROPINIROLE 0.5 MG/1
0.5 TABLET, FILM COATED ORAL 3 TIMES DAILY
Qty: 90 TABLET | Refills: 3 | Status: SHIPPED | OUTPATIENT
Start: 2019-09-03 | End: 2020-02-18

## 2019-09-30 ENCOUNTER — HOSPITAL ENCOUNTER (OUTPATIENT)
Dept: GENERAL RADIOLOGY | Age: 54
Discharge: HOME | End: 2019-09-30

## 2019-09-30 ENCOUNTER — OFFICE VISIT (OUTPATIENT)
Dept: INTERNAL MEDICINE | Age: 54
End: 2019-09-30

## 2019-09-30 VITALS
TEMPERATURE: 97.4 F | OXYGEN SATURATION: 97 % | BODY MASS INDEX: 27.3 KG/M2 | WEIGHT: 195 LBS | HEIGHT: 71 IN | RESPIRATION RATE: 18 BRPM | DIASTOLIC BLOOD PRESSURE: 82 MMHG | HEART RATE: 88 BPM | SYSTOLIC BLOOD PRESSURE: 138 MMHG

## 2019-09-30 DIAGNOSIS — R05.9 COUGH: Primary | ICD-10-CM

## 2019-09-30 DIAGNOSIS — I15.9 SECONDARY HYPERTENSION: ICD-10-CM

## 2019-09-30 DIAGNOSIS — R05.9 COUGH: ICD-10-CM

## 2019-09-30 DIAGNOSIS — D75.839 THROMBOCYTOSIS: ICD-10-CM

## 2019-09-30 DIAGNOSIS — G25.81 RESTLESS LEG SYNDROME: ICD-10-CM

## 2019-09-30 PROCEDURE — 71046 X-RAY EXAM CHEST 2 VIEWS: CPT

## 2019-09-30 PROCEDURE — 96372 THER/PROPH/DIAG INJ SC/IM: CPT | Performed by: NURSE PRACTITIONER

## 2019-09-30 PROCEDURE — 99214 OFFICE O/P EST MOD 30 MIN: CPT | Performed by: NURSE PRACTITIONER

## 2019-09-30 RX ORDER — PROMETHAZINE HYDROCHLORIDE AND CODEINE PHOSPHATE 6.25; 1 MG/5ML; MG/5ML
5 SYRUP ORAL EVERY 4 HOURS PRN
Qty: 118 ML | Refills: 0 | Status: SHIPPED | OUTPATIENT
Start: 2019-09-30 | End: 2019-10-03

## 2019-09-30 RX ORDER — LEVOFLOXACIN 500 MG/1
500 TABLET, FILM COATED ORAL DAILY
Qty: 7 TABLET | Refills: 0 | Status: SHIPPED | OUTPATIENT
Start: 2019-09-30 | End: 2020-03-05 | Stop reason: SDUPTHER

## 2019-09-30 RX ORDER — METHYLPREDNISOLONE ACETATE 80 MG/ML
80 INJECTION, SUSPENSION INTRA-ARTICULAR; INTRALESIONAL; INTRAMUSCULAR; SOFT TISSUE ONCE
Status: COMPLETED | OUTPATIENT
Start: 2019-09-30 | End: 2019-09-30

## 2019-09-30 RX ADMIN — METHYLPREDNISOLONE ACETATE 80 MG: 80 INJECTION, SUSPENSION INTRA-ARTICULAR; INTRALESIONAL; INTRAMUSCULAR; SOFT TISSUE at 16:29

## 2019-09-30 NOTE — PATIENT INSTRUCTIONS
1.  Acute bronchitis with cough uncontrolled  [id number] IM today   levaquin 500 daily for 7 days;    mucinex 1200 mg twice a dy for 7 days  With lots of water   Promethazine with codeine for cough \  Chest x-ray today  #2 history of thrombocytosis we will get labs today  3. Restless leg syndrome he will pick his prescription up today and let us know if that does not seem to work  4.   Attention level today is good no changes are necessary

## 2019-09-30 NOTE — PROGRESS NOTES
HCT 46.5 06/24/2019    MCV 92.3 06/24/2019     (H) 06/24/2019    LABLYMP 3.86 03/07/2011    LYMPHOPCT 30.0 06/24/2019    RBC 5.04 06/24/2019    MCH 30.8 06/24/2019    MCHC 33.3 06/24/2019    RDW 13.6 06/24/2019     Lab Results   Component Value Date    VITD25 16.1 (L) 05/14/2019       Subjective:      Review of Systems    Objective:     Physical Exam  /82   Pulse 88   Temp 97.4 °F (36.3 °C)   Resp 18   Ht 5' 11\" (1.803 m)   Wt 195 lb (88.5 kg)   SpO2 97%   BMI 27.20 kg/m²     Assessment:       Diagnosis Orders   1. Cough  XR CHEST STANDARD (2 VW)    CBC Auto Differential    promethazine-codeine (PHENERGAN WITH CODEINE) 6.25-10 MG/5ML syrup   2. Secondary hypertension     3. Thrombocytosis ([address]     4. Restless leg syndrome       Labs reviewedfrom today  Diagnostics reviewed from today  Plan:        Patient given educational materials - see patient instructions. Discussed use, benefit, and side effects of prescribed medications. Allpatient questions answered. Pt voiced understanding. Reviewed health maintenance. Instructed to continue current medications, diet and exercise. Patient agreed with treatment plan. Follow up as directed. MEDICATIONS:  Orders Placed This Encounter   Medications    methylPREDNISolone acetate (DEPO-MEDROL) injection 80 mg    levofloxacin (LEVAQUIN) 500 MG tablet     Sig: Take 1 tablet by mouth daily for 7 days     Dispense:  7 tablet     Refill:  0    promethazine-codeine (PHENERGAN WITH CODEINE) 6.25-10 MG/5ML syrup     Sig: Take 5 mLs by mouth every 4 hours as needed for Cough for up to 3 days. Dispense:  118 mL     Refill:  0     Reduce doses taken as pain becomes manageable         ORDERS:  Orders Placed This Encounter   Procedures    XR CHEST STANDARD (2 VW)    CBC Auto Differential       Follow-up:  Return for keep fu appt. PATIENT INSTRUCTIONS:  Patient Instructions   1.   Acute bronchitis with cough uncontrolled  [id number] IM today   levaquin

## 2019-10-06 ENCOUNTER — HOSPITAL ENCOUNTER (EMERGENCY)
Age: 54
Discharge: HOME | End: 2019-10-06
Attending: EMERGENCY MEDICINE
Payer: COMMERCIAL

## 2019-10-06 ENCOUNTER — APPOINTMENT (OUTPATIENT)
Dept: GENERAL RADIOLOGY | Age: 54
End: 2019-10-06
Payer: COMMERCIAL

## 2019-10-06 VITALS
TEMPERATURE: 97.3 F | OXYGEN SATURATION: 100 % | SYSTOLIC BLOOD PRESSURE: 139 MMHG | HEART RATE: 96 BPM | HEIGHT: 71 IN | WEIGHT: 200 LBS | DIASTOLIC BLOOD PRESSURE: 88 MMHG | BODY MASS INDEX: 28 KG/M2 | RESPIRATION RATE: 18 BRPM

## 2019-10-06 DIAGNOSIS — M19.031 ARTHRITIS OF RIGHT WRIST: Primary | ICD-10-CM

## 2019-10-06 DIAGNOSIS — M25.531 RIGHT WRIST PAIN: ICD-10-CM

## 2019-10-06 DIAGNOSIS — M19.90 ACUTE ARTHRITIS: ICD-10-CM

## 2019-10-06 LAB
ALBUMIN SERPL-MCNC: 4.2 G/DL (ref 3.5–5.2)
ALP BLD-CCNC: 103 U/L (ref 40–130)
ALT SERPL-CCNC: 34 U/L (ref 5–41)
ANION GAP SERPL CALCULATED.3IONS-SCNC: 14 MMOL/L (ref 7–19)
AST SERPL-CCNC: 21 U/L (ref 5–40)
BANDED NEUTROPHILS RELATIVE PERCENT: 1 % (ref 0–5)
BASOPHILS ABSOLUTE: 0 K/UL (ref 0–0.2)
BASOPHILS RELATIVE PERCENT: 0 % (ref 0–1)
BILIRUB SERPL-MCNC: 0.3 MG/DL (ref 0.2–1.2)
BUN BLDV-MCNC: 25 MG/DL (ref 6–20)
C-REACTIVE PROTEIN: 2.93 MG/DL (ref 0–0.5)
CALCIUM SERPL-MCNC: 9.2 MG/DL (ref 8.6–10)
CHLORIDE BLD-SCNC: 99 MMOL/L (ref 98–111)
CO2: 22 MMOL/L (ref 22–29)
CREAT SERPL-MCNC: 0.9 MG/DL (ref 0.5–1.2)
EOSINOPHILS ABSOLUTE: 0.23 K/UL (ref 0–0.6)
EOSINOPHILS RELATIVE PERCENT: 1 % (ref 0–5)
GFR NON-AFRICAN AMERICAN: >60
GLUCOSE BLD-MCNC: 126 MG/DL (ref 74–109)
HCT VFR BLD CALC: 41.3 % (ref 42–52)
HEMOGLOBIN: 14 G/DL (ref 14–18)
IMMATURE GRANULOCYTES #: 0.2 K/UL
LYMPHOCYTES ABSOLUTE: 2.5 K/UL (ref 1.1–4.5)
LYMPHOCYTES RELATIVE PERCENT: 11 % (ref 20–40)
MCH RBC QN AUTO: 31.7 PG (ref 27–31)
MCHC RBC AUTO-ENTMCNC: 33.9 G/DL (ref 33–37)
MCV RBC AUTO: 93.4 FL (ref 80–94)
MONOCYTES ABSOLUTE: 1.8 K/UL (ref 0–0.9)
MONOCYTES RELATIVE PERCENT: 8 % (ref 0–10)
NEUTROPHILS ABSOLUTE: 18.3 K/UL (ref 1.5–7.5)
NEUTROPHILS RELATIVE PERCENT: 79 % (ref 50–65)
PDW BLD-RTO: 12.5 % (ref 11.5–14.5)
PLATELET # BLD: 463 K/UL (ref 130–400)
PLATELET SLIDE REVIEW: ABNORMAL
PMV BLD AUTO: 8.5 FL (ref 9.4–12.4)
POTASSIUM REFLEX MAGNESIUM: 4.3 MMOL/L (ref 3.5–5)
RBC # BLD: 4.42 M/UL (ref 4.7–6.1)
RBC # BLD: NORMAL 10*6/UL
SEDIMENTATION RATE, ERYTHROCYTE: 18 MM/HR (ref 0–15)
SODIUM BLD-SCNC: 135 MMOL/L (ref 136–145)
TOTAL PROTEIN: 7.1 G/DL (ref 6.6–8.7)
URIC ACID, SERUM: 7 MG/DL (ref 3.4–7)
WBC # BLD: 22.9 K/UL (ref 4.8–10.8)

## 2019-10-06 PROCEDURE — 85025 COMPLETE CBC W/AUTO DIFF WBC: CPT

## 2019-10-06 PROCEDURE — 84550 ASSAY OF BLOOD/URIC ACID: CPT

## 2019-10-06 PROCEDURE — 86140 C-REACTIVE PROTEIN: CPT

## 2019-10-06 PROCEDURE — 36415 COLL VENOUS BLD VENIPUNCTURE: CPT

## 2019-10-06 PROCEDURE — 85652 RBC SED RATE AUTOMATED: CPT

## 2019-10-06 PROCEDURE — 96374 THER/PROPH/DIAG INJ IV PUSH: CPT

## 2019-10-06 PROCEDURE — 99284 EMERGENCY DEPT VISIT MOD MDM: CPT

## 2019-10-06 PROCEDURE — 96375 TX/PRO/DX INJ NEW DRUG ADDON: CPT

## 2019-10-06 PROCEDURE — 73110 X-RAY EXAM OF WRIST: CPT

## 2019-10-06 PROCEDURE — 6360000002 HC RX W HCPCS: Performed by: EMERGENCY MEDICINE

## 2019-10-06 PROCEDURE — 80053 COMPREHEN METABOLIC PANEL: CPT

## 2019-10-06 RX ORDER — HYDROCODONE BITARTRATE AND ACETAMINOPHEN 10; 325 MG/1; MG/1
1 TABLET ORAL EVERY 6 HOURS PRN
Qty: 12 TABLET | Refills: 0 | Status: SHIPPED | OUTPATIENT
Start: 2019-10-06 | End: 2019-10-09

## 2019-10-06 RX ORDER — DEXAMETHASONE SODIUM PHOSPHATE 10 MG/ML
10 INJECTION, SOLUTION INTRAMUSCULAR; INTRAVENOUS ONCE
Status: COMPLETED | OUTPATIENT
Start: 2019-10-06 | End: 2019-10-06

## 2019-10-06 RX ORDER — ONDANSETRON 2 MG/ML
4 INJECTION INTRAMUSCULAR; INTRAVENOUS ONCE
Status: COMPLETED | OUTPATIENT
Start: 2019-10-06 | End: 2019-10-06

## 2019-10-06 RX ORDER — PREDNISONE 10 MG/1
TABLET ORAL
Qty: 30 TABLET | Refills: 0 | Status: SHIPPED | OUTPATIENT
Start: 2019-10-06 | End: 2019-10-16 | Stop reason: SINTOL

## 2019-10-06 RX ADMIN — ONDANSETRON 4 MG: 2 INJECTION INTRAMUSCULAR; INTRAVENOUS at 17:24

## 2019-10-06 RX ADMIN — HYDROMORPHONE HYDROCHLORIDE 1 MG: 1 INJECTION, SOLUTION INTRAMUSCULAR; INTRAVENOUS; SUBCUTANEOUS at 18:14

## 2019-10-06 RX ADMIN — HYDROMORPHONE HYDROCHLORIDE 1 MG: 1 INJECTION, SOLUTION INTRAMUSCULAR; INTRAVENOUS; SUBCUTANEOUS at 17:24

## 2019-10-06 RX ADMIN — DEXAMETHASONE SODIUM PHOSPHATE 10 MG: 10 INJECTION, SOLUTION INTRAMUSCULAR; INTRAVENOUS at 17:25

## 2019-10-06 ASSESSMENT — ENCOUNTER SYMPTOMS
VOICE CHANGE: 0
COUGH: 1
NAUSEA: 0
BLOOD IN STOOL: 0
SINUS PRESSURE: 0
APNEA: 0
ABDOMINAL PAIN: 0
CONSTIPATION: 0
EYE DISCHARGE: 0
DIARRHEA: 0
SORE THROAT: 0
CHOKING: 0
FACIAL SWELLING: 0

## 2019-10-06 ASSESSMENT — PAIN SCALES - GENERAL
PAINLEVEL_OUTOF10: 9
PAINLEVEL_OUTOF10: 10

## 2019-10-06 ASSESSMENT — PAIN DESCRIPTION - LOCATION: LOCATION: ARM

## 2019-10-06 ASSESSMENT — PAIN DESCRIPTION - ORIENTATION: ORIENTATION: RIGHT

## 2019-10-08 ENCOUNTER — TELEPHONE (OUTPATIENT)
Dept: INTERNAL MEDICINE | Age: 54
End: 2019-10-08

## 2019-10-16 ENCOUNTER — OFFICE VISIT (OUTPATIENT)
Dept: INTERNAL MEDICINE | Age: 54
End: 2019-10-16
Payer: COMMERCIAL

## 2019-10-16 VITALS
HEIGHT: 71 IN | RESPIRATION RATE: 18 BRPM | SYSTOLIC BLOOD PRESSURE: 138 MMHG | BODY MASS INDEX: 27.3 KG/M2 | HEART RATE: 94 BPM | WEIGHT: 195 LBS | DIASTOLIC BLOOD PRESSURE: 82 MMHG | OXYGEN SATURATION: 97 %

## 2019-10-16 DIAGNOSIS — M25.50 ARTHRALGIA, UNSPECIFIED JOINT: Primary | ICD-10-CM

## 2019-10-16 PROCEDURE — 99215 OFFICE O/P EST HI 40 MIN: CPT | Performed by: NURSE PRACTITIONER

## 2019-10-16 PROCEDURE — 1111F DSCHRG MED/CURRENT MED MERGE: CPT | Performed by: NURSE PRACTITIONER

## 2019-10-17 ENCOUNTER — TELEPHONE (OUTPATIENT)
Dept: INTERNAL MEDICINE | Age: 54
End: 2019-10-17

## 2019-10-17 DIAGNOSIS — D75.839 THROMBOCYTOSIS: Primary | ICD-10-CM

## 2019-10-21 DIAGNOSIS — M19.90 ARTHRITIS: Primary | ICD-10-CM

## 2019-11-13 ENCOUNTER — OFFICE VISIT (OUTPATIENT)
Dept: INTERNAL MEDICINE | Age: 54
End: 2019-11-13
Payer: COMMERCIAL

## 2019-11-13 VITALS
DIASTOLIC BLOOD PRESSURE: 80 MMHG | OXYGEN SATURATION: 95 % | HEART RATE: 110 BPM | SYSTOLIC BLOOD PRESSURE: 151 MMHG | BODY MASS INDEX: 27.58 KG/M2 | HEIGHT: 71 IN | WEIGHT: 197 LBS | RESPIRATION RATE: 18 BRPM

## 2019-11-13 DIAGNOSIS — G89.29 CHRONIC BACK PAIN, UNSPECIFIED BACK LOCATION, UNSPECIFIED BACK PAIN LATERALITY: Primary | ICD-10-CM

## 2019-11-13 DIAGNOSIS — M54.9 CHRONIC BACK PAIN, UNSPECIFIED BACK LOCATION, UNSPECIFIED BACK PAIN LATERALITY: Primary | ICD-10-CM

## 2019-11-13 DIAGNOSIS — M25.531 RIGHT WRIST PAIN: ICD-10-CM

## 2019-11-13 DIAGNOSIS — Z87.39 PERSONAL HISTORY OF RHEUMATOID ARTHRITIS: ICD-10-CM

## 2019-11-13 DIAGNOSIS — D75.839 THROMBOCYTOSIS: ICD-10-CM

## 2019-11-13 DIAGNOSIS — I15.9 SECONDARY HYPERTENSION: ICD-10-CM

## 2019-11-13 PROCEDURE — 99214 OFFICE O/P EST MOD 30 MIN: CPT | Performed by: NURSE PRACTITIONER

## 2019-11-13 RX ORDER — CYCLOBENZAPRINE HCL 10 MG
10 TABLET ORAL 3 TIMES DAILY PRN
Qty: 60 TABLET | Refills: 2 | Status: SHIPPED | OUTPATIENT
Start: 2019-11-13 | End: 2019-12-13

## 2019-11-13 ASSESSMENT — ENCOUNTER SYMPTOMS
EYE DISCHARGE: 0
BACK PAIN: 1
CONSTIPATION: 0
TROUBLE SWALLOWING: 0
SORE THROAT: 0
DIARRHEA: 0
COUGH: 0
STRIDOR: 0
CHOKING: 0
VOMITING: 0
BLOOD IN STOOL: 0
EYE ITCHING: 0
ABDOMINAL PAIN: 0
ABDOMINAL DISTENTION: 0
NAUSEA: 0
COLOR CHANGE: 0
WHEEZING: 0
SHORTNESS OF BREATH: 0

## 2019-11-20 DIAGNOSIS — D75.839 THROMBOCYTOSIS: Primary | ICD-10-CM

## 2019-11-26 VITALS
SYSTOLIC BLOOD PRESSURE: 136 MMHG | WEIGHT: 200 LBS | HEIGHT: 71 IN | BODY MASS INDEX: 28 KG/M2 | HEART RATE: 99 BPM | DIASTOLIC BLOOD PRESSURE: 80 MMHG

## 2019-11-26 DIAGNOSIS — D72.829 LEUKOCYTOSIS, UNSPECIFIED TYPE: ICD-10-CM

## 2019-11-26 DIAGNOSIS — F17.200 NICOTINE DEPENDENCE, UNCOMPLICATED, UNSPECIFIED NICOTINE PRODUCT TYPE: ICD-10-CM

## 2019-11-27 ENCOUNTER — HOSPITAL ENCOUNTER (OUTPATIENT)
Dept: INFUSION THERAPY | Age: 54
Discharge: HOME | End: 2019-11-27
Payer: COMMERCIAL

## 2019-11-27 ENCOUNTER — OFFICE VISIT (OUTPATIENT)
Dept: HEMATOLOGY | Age: 54
End: 2019-11-27
Payer: COMMERCIAL

## 2019-11-27 VITALS
SYSTOLIC BLOOD PRESSURE: 128 MMHG | BODY MASS INDEX: 28.11 KG/M2 | WEIGHT: 200.8 LBS | HEIGHT: 71 IN | OXYGEN SATURATION: 98 % | HEART RATE: 68 BPM | DIASTOLIC BLOOD PRESSURE: 80 MMHG

## 2019-11-27 DIAGNOSIS — D75.89 MACROCYTOSIS: ICD-10-CM

## 2019-11-27 DIAGNOSIS — D72.829 LEUKOCYTOSIS, UNSPECIFIED TYPE: ICD-10-CM

## 2019-11-27 DIAGNOSIS — D75.839 THROMBOCYTOSIS: ICD-10-CM

## 2019-11-27 DIAGNOSIS — F17.200 NICOTINE DEPENDENCE, UNCOMPLICATED, UNSPECIFIED NICOTINE PRODUCT TYPE: ICD-10-CM

## 2019-11-27 DIAGNOSIS — D75.839 THROMBOCYTOSIS: Primary | ICD-10-CM

## 2019-11-27 PROCEDURE — 85025 COMPLETE CBC W/AUTO DIFF WBC: CPT

## 2019-11-27 PROCEDURE — 99214 OFFICE O/P EST MOD 30 MIN: CPT | Performed by: PHYSICIAN ASSISTANT

## 2019-11-27 PROCEDURE — 99211 OFF/OP EST MAY X REQ PHY/QHP: CPT

## 2019-11-27 ASSESSMENT — ENCOUNTER SYMPTOMS
VOMITING: 0
WHEEZING: 0
CONSTIPATION: 0
SHORTNESS OF BREATH: 0
SORE THROAT: 0
EYE ITCHING: 0
ABDOMINAL PAIN: 0
BLOOD IN STOOL: 0
NAUSEA: 0
TROUBLE SWALLOWING: 0
COUGH: 1
VOICE CHANGE: 1
BACK PAIN: 1
COLOR CHANGE: 0
DIARRHEA: 0
ABDOMINAL DISTENTION: 0
PHOTOPHOBIA: 0
EYE DISCHARGE: 0

## 2019-12-02 ENCOUNTER — CLINICAL DOCUMENTATION (OUTPATIENT)
Dept: HEMATOLOGY | Age: 54
End: 2019-12-02

## 2019-12-03 ENCOUNTER — OFFICE VISIT (OUTPATIENT)
Dept: INTERNAL MEDICINE | Age: 54
End: 2019-12-03
Payer: COMMERCIAL

## 2019-12-03 VITALS
WEIGHT: 197 LBS | OXYGEN SATURATION: 98 % | HEIGHT: 71 IN | SYSTOLIC BLOOD PRESSURE: 136 MMHG | DIASTOLIC BLOOD PRESSURE: 78 MMHG | RESPIRATION RATE: 18 BRPM | BODY MASS INDEX: 27.58 KG/M2 | HEART RATE: 100 BPM

## 2019-12-03 DIAGNOSIS — J40 BRONCHITIS: ICD-10-CM

## 2019-12-03 DIAGNOSIS — D75.839 THROMBOCYTOSIS: ICD-10-CM

## 2019-12-03 DIAGNOSIS — M19.90 ARTHRITIS: ICD-10-CM

## 2019-12-03 DIAGNOSIS — M25.50 ARTHRALGIA, UNSPECIFIED JOINT: Primary | ICD-10-CM

## 2019-12-03 PROCEDURE — 99213 OFFICE O/P EST LOW 20 MIN: CPT | Performed by: NURSE PRACTITIONER

## 2019-12-03 RX ORDER — CIPROFLOXACIN 500 MG/1
500 TABLET, FILM COATED ORAL 2 TIMES DAILY
Qty: 14 TABLET | Refills: 0 | Status: ON HOLD | OUTPATIENT
Start: 2019-12-03 | End: 2019-12-06 | Stop reason: HOSPADM

## 2019-12-03 RX ORDER — PREDNISONE 1 MG/1
TABLET ORAL
Qty: 24 TABLET | Refills: 0 | Status: ON HOLD | OUTPATIENT
Start: 2019-12-03 | End: 2019-12-06 | Stop reason: HOSPADM

## 2019-12-03 ASSESSMENT — ENCOUNTER SYMPTOMS
STRIDOR: 0
EYE ITCHING: 0
COUGH: 1
CONSTIPATION: 0
ABDOMINAL PAIN: 0
SORE THROAT: 0
ABDOMINAL DISTENTION: 0
COLOR CHANGE: 0
DIARRHEA: 0
WHEEZING: 0
BLOOD IN STOOL: 0
EYE DISCHARGE: 0
TROUBLE SWALLOWING: 0
CHOKING: 0
SHORTNESS OF BREATH: 0
VOMITING: 0
NAUSEA: 0

## 2019-12-04 ENCOUNTER — APPOINTMENT (OUTPATIENT)
Dept: GENERAL RADIOLOGY | Age: 54
DRG: 554 | End: 2019-12-04
Payer: COMMERCIAL

## 2019-12-04 ENCOUNTER — APPOINTMENT (OUTPATIENT)
Dept: MRI IMAGING | Age: 54
DRG: 554 | End: 2019-12-04
Payer: COMMERCIAL

## 2019-12-04 ENCOUNTER — HOSPITAL ENCOUNTER (INPATIENT)
Age: 54
LOS: 2 days | Discharge: HOME | DRG: 554 | End: 2019-12-06
Attending: INTERNAL MEDICINE | Admitting: INTERNAL MEDICINE
Payer: COMMERCIAL

## 2019-12-04 DIAGNOSIS — M00.9 SEPTIC ARTHRITIS OF LEFT ANKLE, DUE TO UNSPECIFIED ORGANISM (HCC): Primary | ICD-10-CM

## 2019-12-04 DIAGNOSIS — D72.829 LEUKOCYTOSIS, UNSPECIFIED TYPE: ICD-10-CM

## 2019-12-04 LAB
ALBUMIN SERPL-MCNC: 3.8 G/DL (ref 3.5–5.2)
ALP BLD-CCNC: 115 U/L (ref 40–130)
ALT SERPL-CCNC: 47 U/L (ref 5–41)
AMPHETAMINE SCREEN, URINE: POSITIVE
ANION GAP SERPL CALCULATED.3IONS-SCNC: 12 MMOL/L (ref 7–19)
AST SERPL-CCNC: 32 U/L (ref 5–40)
BACTERIA: NEGATIVE /HPF
BANDED NEUTROPHILS RELATIVE PERCENT: 9 % (ref 0–5)
BARBITURATE SCREEN URINE: NEGATIVE
BASOPHILS ABSOLUTE: 0 K/UL (ref 0–0.2)
BASOPHILS RELATIVE PERCENT: 0 % (ref 0–1)
BENZODIAZEPINE SCREEN, URINE: NEGATIVE
BILIRUB SERPL-MCNC: 0.6 MG/DL (ref 0.2–1.2)
BILIRUBIN URINE: NEGATIVE
BLOOD, URINE: ABNORMAL
BUN BLDV-MCNC: 19 MG/DL (ref 6–20)
C-REACTIVE PROTEIN: 21.76 MG/DL (ref 0–0.5)
CALCIUM SERPL-MCNC: 9.1 MG/DL (ref 8.6–10)
CANNABINOID SCREEN URINE: NEGATIVE
CHLORIDE BLD-SCNC: 99 MMOL/L (ref 98–111)
CLARITY: CLEAR
CO2: 25 MMOL/L (ref 22–29)
COCAINE METABOLITE SCREEN URINE: NEGATIVE
COLOR: ABNORMAL
CREAT SERPL-MCNC: 0.9 MG/DL (ref 0.5–1.2)
EOSINOPHILS ABSOLUTE: 0 K/UL (ref 0–0.6)
EOSINOPHILS RELATIVE PERCENT: 0 % (ref 0–5)
EPITHELIAL CELLS, UA: 1 /HPF (ref 0–5)
GFR NON-AFRICAN AMERICAN: >60
GLUCOSE BLD-MCNC: 106 MG/DL (ref 70–99)
GLUCOSE BLD-MCNC: 117 MG/DL (ref 74–109)
GLUCOSE URINE: NEGATIVE MG/DL
HCT VFR BLD CALC: 39.1 % (ref 42–52)
HEMOGLOBIN: 12.9 G/DL (ref 14–18)
HYALINE CASTS: 10 /HPF (ref 0–8)
IMMATURE GRANULOCYTES #: 0.1 K/UL
KETONES, URINE: 15 MG/DL
LACTIC ACID: 0.8 MMOL/L (ref 0.5–1.9)
LEUKOCYTE ESTERASE, URINE: NEGATIVE
LYMPHOCYTES ABSOLUTE: 2.7 K/UL (ref 1.1–4.5)
LYMPHOCYTES RELATIVE PERCENT: 8 % (ref 20–40)
Lab: ABNORMAL
MACROCYTES: ABNORMAL
MCH RBC QN AUTO: 31.5 PG (ref 27–31)
MCHC RBC AUTO-ENTMCNC: 33 G/DL (ref 33–37)
MCV RBC AUTO: 95.6 FL (ref 80–94)
MONOCYTES ABSOLUTE: 0.8 K/UL (ref 0–0.9)
MONOCYTES RELATIVE PERCENT: 3 % (ref 0–10)
NEUTROPHILS ABSOLUTE: 23.2 K/UL (ref 1.5–7.5)
NEUTROPHILS RELATIVE PERCENT: 78 % (ref 50–65)
NITRITE, URINE: NEGATIVE
OPIATE SCREEN URINE: POSITIVE
PDW BLD-RTO: 13.2 % (ref 11.5–14.5)
PERFORMED ON: ABNORMAL
PH UA: 6 (ref 5–8)
PLATELET # BLD: 370 K/UL (ref 130–400)
PLATELET SLIDE REVIEW: ABNORMAL
PMV BLD AUTO: 9.1 FL (ref 9.4–12.4)
POTASSIUM SERPL-SCNC: 4 MMOL/L (ref 3.5–5)
PROTEIN UA: 30 MG/DL
RBC # BLD: 4.09 M/UL (ref 4.7–6.1)
RBC UA: 8 /HPF (ref 0–4)
SEDIMENTATION RATE, ERYTHROCYTE: 52 MM/HR (ref 0–15)
SODIUM BLD-SCNC: 136 MMOL/L (ref 136–145)
SPECIFIC GRAVITY UA: 1.03 (ref 1–1.03)
TOTAL PROTEIN: 7.2 G/DL (ref 6.6–8.7)
URINE REFLEX TO CULTURE: ABNORMAL
UROBILINOGEN, URINE: 0.2 E.U./DL
WBC # BLD: 26.7 K/UL (ref 4.8–10.8)
WBC UA: 3 /HPF (ref 0–5)

## 2019-12-04 PROCEDURE — 73610 X-RAY EXAM OF ANKLE: CPT

## 2019-12-04 PROCEDURE — 86140 C-REACTIVE PROTEIN: CPT

## 2019-12-04 PROCEDURE — 96376 TX/PRO/DX INJ SAME DRUG ADON: CPT

## 2019-12-04 PROCEDURE — 6370000000 HC RX 637 (ALT 250 FOR IP): Performed by: INTERNAL MEDICINE

## 2019-12-04 PROCEDURE — 85025 COMPLETE CBC W/AUTO DIFF WBC: CPT

## 2019-12-04 PROCEDURE — 36415 COLL VENOUS BLD VENIPUNCTURE: CPT

## 2019-12-04 PROCEDURE — 1210000000 HC MED SURG R&B

## 2019-12-04 PROCEDURE — 6360000002 HC RX W HCPCS: Performed by: INTERNAL MEDICINE

## 2019-12-04 PROCEDURE — 85652 RBC SED RATE AUTOMATED: CPT

## 2019-12-04 PROCEDURE — 71045 X-RAY EXAM CHEST 1 VIEW: CPT

## 2019-12-04 PROCEDURE — 87040 BLOOD CULTURE FOR BACTERIA: CPT

## 2019-12-04 PROCEDURE — 96375 TX/PRO/DX INJ NEW DRUG ADDON: CPT

## 2019-12-04 PROCEDURE — 2580000003 HC RX 258: Performed by: INTERNAL MEDICINE

## 2019-12-04 PROCEDURE — 81001 URINALYSIS AUTO W/SCOPE: CPT

## 2019-12-04 PROCEDURE — 2580000003 HC RX 258: Performed by: NURSE PRACTITIONER

## 2019-12-04 PROCEDURE — 83605 ASSAY OF LACTIC ACID: CPT

## 2019-12-04 PROCEDURE — 80053 COMPREHEN METABOLIC PANEL: CPT

## 2019-12-04 PROCEDURE — 80307 DRUG TEST PRSMV CHEM ANLYZR: CPT

## 2019-12-04 PROCEDURE — 6360000002 HC RX W HCPCS: Performed by: NURSE PRACTITIONER

## 2019-12-04 PROCEDURE — 96374 THER/PROPH/DIAG INJ IV PUSH: CPT

## 2019-12-04 PROCEDURE — 99285 EMERGENCY DEPT VISIT HI MDM: CPT

## 2019-12-04 RX ORDER — LORAZEPAM 2 MG/ML
1 INJECTION INTRAMUSCULAR ONCE
Status: COMPLETED | OUTPATIENT
Start: 2019-12-04 | End: 2019-12-04

## 2019-12-04 RX ORDER — HALOPERIDOL 5 MG/ML
5 INJECTION INTRAMUSCULAR ONCE
Status: COMPLETED | OUTPATIENT
Start: 2019-12-04 | End: 2019-12-04

## 2019-12-04 RX ORDER — LISINOPRIL 10 MG/1
10 TABLET ORAL DAILY
Status: ON HOLD | COMMUNITY
End: 2019-12-06 | Stop reason: HOSPADM

## 2019-12-04 RX ORDER — ROPINIROLE 0.25 MG/1
0.5 TABLET, FILM COATED ORAL 3 TIMES DAILY
Status: DISCONTINUED | OUTPATIENT
Start: 2019-12-04 | End: 2019-12-06 | Stop reason: HOSPADM

## 2019-12-04 RX ORDER — CEFEPIME HYDROCHLORIDE 2 G/50ML
2 INJECTION, SOLUTION INTRAVENOUS EVERY 12 HOURS
Status: DISCONTINUED | OUTPATIENT
Start: 2019-12-04 | End: 2019-12-04 | Stop reason: SDUPTHER

## 2019-12-04 RX ORDER — LORAZEPAM 2 MG/ML
2 INJECTION INTRAMUSCULAR ONCE
Status: COMPLETED | OUTPATIENT
Start: 2019-12-04 | End: 2019-12-04

## 2019-12-04 RX ORDER — CYCLOBENZAPRINE HCL 10 MG
10 TABLET ORAL 3 TIMES DAILY PRN
Status: DISCONTINUED | OUTPATIENT
Start: 2019-12-04 | End: 2019-12-06 | Stop reason: HOSPADM

## 2019-12-04 RX ORDER — SODIUM CHLORIDE 9 MG/ML
INJECTION, SOLUTION INTRAVENOUS CONTINUOUS
Status: DISCONTINUED | OUTPATIENT
Start: 2019-12-04 | End: 2019-12-06 | Stop reason: HOSPADM

## 2019-12-04 RX ORDER — ONDANSETRON 2 MG/ML
4 INJECTION INTRAMUSCULAR; INTRAVENOUS EVERY 6 HOURS PRN
Status: DISCONTINUED | OUTPATIENT
Start: 2019-12-04 | End: 2019-12-06 | Stop reason: HOSPADM

## 2019-12-04 RX ORDER — SENNA PLUS 8.6 MG/1
1 TABLET ORAL DAILY PRN
Status: DISCONTINUED | OUTPATIENT
Start: 2019-12-04 | End: 2019-12-06 | Stop reason: HOSPADM

## 2019-12-04 RX ORDER — ONDANSETRON 2 MG/ML
4 INJECTION INTRAMUSCULAR; INTRAVENOUS ONCE
Status: COMPLETED | OUTPATIENT
Start: 2019-12-04 | End: 2019-12-04

## 2019-12-04 RX ORDER — 0.9 % SODIUM CHLORIDE 0.9 %
1000 INTRAVENOUS SOLUTION INTRAVENOUS ONCE
Status: COMPLETED | OUTPATIENT
Start: 2019-12-04 | End: 2019-12-04

## 2019-12-04 RX ORDER — HYDROCODONE BITARTRATE AND ACETAMINOPHEN 5; 325 MG/1; MG/1
1 TABLET ORAL EVERY 6 HOURS PRN
Status: DISCONTINUED | OUTPATIENT
Start: 2019-12-04 | End: 2019-12-06 | Stop reason: HOSPADM

## 2019-12-04 RX ORDER — ACETAMINOPHEN 325 MG/1
650 TABLET ORAL EVERY 8 HOURS PRN
Status: DISCONTINUED | OUTPATIENT
Start: 2019-12-04 | End: 2019-12-06 | Stop reason: HOSPADM

## 2019-12-04 RX ADMIN — HYDROMORPHONE HYDROCHLORIDE 1 MG: 1 INJECTION, SOLUTION INTRAMUSCULAR; INTRAVENOUS; SUBCUTANEOUS at 02:14

## 2019-12-04 RX ADMIN — HYDROMORPHONE HYDROCHLORIDE 0.5 MG: 1 INJECTION, SOLUTION INTRAMUSCULAR; INTRAVENOUS; SUBCUTANEOUS at 13:17

## 2019-12-04 RX ADMIN — SODIUM CHLORIDE 1000 ML: 9 INJECTION, SOLUTION INTRAVENOUS at 02:58

## 2019-12-04 RX ADMIN — LORAZEPAM 1 MG: 2 INJECTION INTRAMUSCULAR; INTRAVENOUS at 23:07

## 2019-12-04 RX ADMIN — VANCOMYCIN HYDROCHLORIDE 1250 MG: 10 INJECTION, POWDER, LYOPHILIZED, FOR SOLUTION INTRAVENOUS at 14:50

## 2019-12-04 RX ADMIN — ONDANSETRON 4 MG: 2 INJECTION INTRAMUSCULAR; INTRAVENOUS at 01:14

## 2019-12-04 RX ADMIN — Medication 1000 MG: at 03:02

## 2019-12-04 RX ADMIN — Medication 2 G: at 18:03

## 2019-12-04 RX ADMIN — LORAZEPAM 2 MG: 2 INJECTION INTRAMUSCULAR; INTRAVENOUS at 19:08

## 2019-12-04 RX ADMIN — HALOPERIDOL LACTATE 5 MG: 5 INJECTION INTRAMUSCULAR at 23:08

## 2019-12-04 RX ADMIN — HYDROCODONE BITARTRATE AND ACETAMINOPHEN 1 TABLET: 5; 325 TABLET ORAL at 09:35

## 2019-12-04 RX ADMIN — SODIUM CHLORIDE: 9 INJECTION, SOLUTION INTRAVENOUS at 07:06

## 2019-12-04 RX ADMIN — HYDROMORPHONE HYDROCHLORIDE 1 MG: 1 INJECTION, SOLUTION INTRAMUSCULAR; INTRAVENOUS; SUBCUTANEOUS at 01:14

## 2019-12-04 RX ADMIN — HYDROMORPHONE HYDROCHLORIDE 1 MG: 1 INJECTION, SOLUTION INTRAMUSCULAR; INTRAVENOUS; SUBCUTANEOUS at 03:04

## 2019-12-04 RX ADMIN — HYDROCODONE BITARTRATE AND ACETAMINOPHEN 1 TABLET: 5; 325 TABLET ORAL at 16:38

## 2019-12-04 RX ADMIN — ENOXAPARIN SODIUM 40 MG: 40 INJECTION SUBCUTANEOUS at 09:35

## 2019-12-04 RX ADMIN — CEFEPIME HYDROCHLORIDE 2 G: 2 INJECTION, POWDER, FOR SOLUTION INTRAVENOUS at 02:58

## 2019-12-04 ASSESSMENT — PAIN DESCRIPTION - LOCATION: LOCATION: ARM

## 2019-12-04 ASSESSMENT — ENCOUNTER SYMPTOMS
HEARTBURN: 0
PHOTOPHOBIA: 0
BLURRED VISION: 0
HEMOPTYSIS: 0
ABDOMINAL PAIN: 0
ORTHOPNEA: 0
SHORTNESS OF BREATH: 0
NAUSEA: 0
VOMITING: 0
VOICE CHANGE: 1
COUGH: 0
SPUTUM PRODUCTION: 0
EYE PAIN: 0
DOUBLE VISION: 0

## 2019-12-04 ASSESSMENT — PAIN SCALES - GENERAL
PAINLEVEL_OUTOF10: 2
PAINLEVEL_OUTOF10: 10
PAINLEVEL_OUTOF10: 10
PAINLEVEL_OUTOF10: 8
PAINLEVEL_OUTOF10: 10
PAINLEVEL_OUTOF10: 10
PAINLEVEL_OUTOF10: 8
PAINLEVEL_OUTOF10: 7

## 2019-12-04 ASSESSMENT — PAIN DESCRIPTION - ORIENTATION: ORIENTATION: LEFT

## 2019-12-05 LAB
ALBUMIN SERPL-MCNC: 3.3 G/DL (ref 3.5–5.2)
ALP BLD-CCNC: 115 U/L (ref 40–130)
ALT SERPL-CCNC: 33 U/L (ref 5–41)
ANION GAP SERPL CALCULATED.3IONS-SCNC: 16 MMOL/L (ref 7–19)
AST SERPL-CCNC: 21 U/L (ref 5–40)
BASE EXCESS ARTERIAL: -2.3 MMOL/L (ref -2–2)
BASOPHILS ABSOLUTE: 0.1 K/UL (ref 0–0.2)
BASOPHILS RELATIVE PERCENT: 0.4 % (ref 0–1)
BILIRUB SERPL-MCNC: 0.5 MG/DL (ref 0.2–1.2)
BUN BLDV-MCNC: 12 MG/DL (ref 6–20)
CALCIUM SERPL-MCNC: 8.9 MG/DL (ref 8.6–10)
CARBOXYHEMOGLOBIN ARTERIAL: 2.2 % (ref 0–5)
CHLORIDE BLD-SCNC: 98 MMOL/L (ref 98–111)
CO2: 21 MMOL/L (ref 22–29)
CREAT SERPL-MCNC: 0.7 MG/DL (ref 0.5–1.2)
EOSINOPHILS ABSOLUTE: 0 K/UL (ref 0–0.6)
EOSINOPHILS RELATIVE PERCENT: 0.1 % (ref 0–5)
GFR NON-AFRICAN AMERICAN: >60
GLUCOSE BLD-MCNC: 100 MG/DL (ref 70–99)
GLUCOSE BLD-MCNC: 105 MG/DL (ref 70–99)
GLUCOSE BLD-MCNC: 122 MG/DL (ref 74–109)
GLUCOSE BLD-MCNC: 147 MG/DL (ref 70–99)
HCO3 ARTERIAL: 22.5 MMOL/L (ref 22–26)
HCT VFR BLD CALC: 37.9 % (ref 42–52)
HEMOGLOBIN, ART, EXTENDED: 13 G/DL (ref 14–18)
HEMOGLOBIN: 12.2 G/DL (ref 14–18)
IMMATURE GRANULOCYTES #: 0.1 K/UL
LYMPHOCYTES ABSOLUTE: 2.1 K/UL (ref 1.1–4.5)
LYMPHOCYTES RELATIVE PERCENT: 9.4 % (ref 20–40)
MCH RBC QN AUTO: 31 PG (ref 27–31)
MCHC RBC AUTO-ENTMCNC: 32.2 G/DL (ref 33–37)
MCV RBC AUTO: 96.4 FL (ref 80–94)
METHEMOGLOBIN ARTERIAL: 0.9 %
MONOCYTES ABSOLUTE: 2 K/UL (ref 0–0.9)
MONOCYTES RELATIVE PERCENT: 8.7 % (ref 0–10)
NEUTROPHILS ABSOLUTE: 18.3 K/UL (ref 1.5–7.5)
NEUTROPHILS RELATIVE PERCENT: 80.9 % (ref 50–65)
O2 CONTENT ARTERIAL: 16.5 ML/DL
O2 SAT, ARTERIAL: 90.2 %
O2 THERAPY: ABNORMAL
PCO2 ARTERIAL: 38 MMHG (ref 35–45)
PDW BLD-RTO: 13.2 % (ref 11.5–14.5)
PERFORMED ON: ABNORMAL
PH ARTERIAL: 7.38 (ref 7.35–7.45)
PLATELET # BLD: 411 K/UL (ref 130–400)
PMV BLD AUTO: 8.9 FL (ref 9.4–12.4)
PO2 ARTERIAL: 55 MMHG (ref 80–100)
POTASSIUM SERPL-SCNC: 4 MMOL/L (ref 3.5–5)
POTASSIUM, WHOLE BLOOD: 3.9
RBC # BLD: 3.93 M/UL (ref 4.7–6.1)
SODIUM BLD-SCNC: 135 MMOL/L (ref 136–145)
TOTAL PROTEIN: 6.8 G/DL (ref 6.6–8.7)
VANCOMYCIN TROUGH: 4.1 UG/ML (ref 10–20)
WBC # BLD: 22.7 K/UL (ref 4.8–10.8)

## 2019-12-05 PROCEDURE — 85025 COMPLETE CBC W/AUTO DIFF WBC: CPT

## 2019-12-05 PROCEDURE — 80053 COMPREHEN METABOLIC PANEL: CPT

## 2019-12-05 PROCEDURE — 86618 LYME DISEASE ANTIBODY: CPT

## 2019-12-05 PROCEDURE — 36600 WITHDRAWAL OF ARTERIAL BLOOD: CPT

## 2019-12-05 PROCEDURE — 82803 BLOOD GASES ANY COMBINATION: CPT

## 2019-12-05 PROCEDURE — 82948 REAGENT STRIP/BLOOD GLUCOSE: CPT

## 2019-12-05 PROCEDURE — 84132 ASSAY OF SERUM POTASSIUM: CPT

## 2019-12-05 PROCEDURE — 99253 IP/OBS CNSLTJ NEW/EST LOW 45: CPT | Performed by: PSYCHIATRY & NEUROLOGY

## 2019-12-05 PROCEDURE — 6370000000 HC RX 637 (ALT 250 FOR IP): Performed by: INTERNAL MEDICINE

## 2019-12-05 PROCEDURE — 80202 ASSAY OF VANCOMYCIN: CPT

## 2019-12-05 PROCEDURE — 6360000002 HC RX W HCPCS: Performed by: INTERNAL MEDICINE

## 2019-12-05 PROCEDURE — 2580000003 HC RX 258: Performed by: INTERNAL MEDICINE

## 2019-12-05 PROCEDURE — 36415 COLL VENOUS BLD VENIPUNCTURE: CPT

## 2019-12-05 PROCEDURE — 1210000000 HC MED SURG R&B

## 2019-12-05 RX ADMIN — Medication 2 G: at 05:23

## 2019-12-05 RX ADMIN — ROPINIROLE HYDROCHLORIDE 0.5 MG: 0.25 TABLET, FILM COATED ORAL at 08:37

## 2019-12-05 RX ADMIN — Medication 2 G: at 17:42

## 2019-12-05 RX ADMIN — ACETAMINOPHEN 650 MG: 325 TABLET ORAL at 08:36

## 2019-12-05 RX ADMIN — VANCOMYCIN HYDROCHLORIDE 1250 MG: 10 INJECTION, POWDER, LYOPHILIZED, FOR SOLUTION INTRAVENOUS at 13:45

## 2019-12-05 RX ADMIN — VANCOMYCIN HYDROCHLORIDE 1250 MG: 10 INJECTION, POWDER, LYOPHILIZED, FOR SOLUTION INTRAVENOUS at 02:31

## 2019-12-05 RX ADMIN — ROPINIROLE HYDROCHLORIDE 0.5 MG: 0.25 TABLET, FILM COATED ORAL at 20:24

## 2019-12-05 RX ADMIN — ROPINIROLE HYDROCHLORIDE 0.5 MG: 0.25 TABLET, FILM COATED ORAL at 13:45

## 2019-12-05 RX ADMIN — HYDROCODONE BITARTRATE AND ACETAMINOPHEN 1 TABLET: 5; 325 TABLET ORAL at 20:24

## 2019-12-05 RX ADMIN — ENOXAPARIN SODIUM 40 MG: 40 INJECTION SUBCUTANEOUS at 08:37

## 2019-12-05 ASSESSMENT — PAIN SCALES - GENERAL
PAINLEVEL_OUTOF10: 3
PAINLEVEL_OUTOF10: 0
PAINLEVEL_OUTOF10: 6

## 2019-12-06 VITALS
BODY MASS INDEX: 29.31 KG/M2 | SYSTOLIC BLOOD PRESSURE: 135 MMHG | HEIGHT: 71 IN | DIASTOLIC BLOOD PRESSURE: 80 MMHG | WEIGHT: 209.4 LBS | RESPIRATION RATE: 20 BRPM | TEMPERATURE: 97.9 F | HEART RATE: 87 BPM | OXYGEN SATURATION: 93 %

## 2019-12-06 LAB
ALBUMIN SERPL-MCNC: 3.1 G/DL (ref 3.5–5.2)
ALP BLD-CCNC: 118 U/L (ref 40–130)
ALT SERPL-CCNC: 30 U/L (ref 5–41)
ANION GAP SERPL CALCULATED.3IONS-SCNC: 13 MMOL/L (ref 7–19)
AST SERPL-CCNC: 19 U/L (ref 5–40)
BASOPHILS ABSOLUTE: 0.1 K/UL (ref 0–0.2)
BASOPHILS RELATIVE PERCENT: 0.8 % (ref 0–1)
BILIRUB SERPL-MCNC: 0.3 MG/DL (ref 0.2–1.2)
BUN BLDV-MCNC: 13 MG/DL (ref 6–20)
CALCIUM SERPL-MCNC: 8.8 MG/DL (ref 8.6–10)
CHLORIDE BLD-SCNC: 102 MMOL/L (ref 98–111)
CO2: 25 MMOL/L (ref 22–29)
CREAT SERPL-MCNC: 0.6 MG/DL (ref 0.5–1.2)
EOSINOPHILS ABSOLUTE: 0.5 K/UL (ref 0–0.6)
EOSINOPHILS RELATIVE PERCENT: 2.9 % (ref 0–5)
GFR NON-AFRICAN AMERICAN: >60
GLUCOSE BLD-MCNC: 120 MG/DL (ref 74–109)
HCT VFR BLD CALC: 38.7 % (ref 42–52)
HEMOGLOBIN: 12.7 G/DL (ref 14–18)
HEPATITIS C ANTIBODY INTERPRETATION: NORMAL
IMMATURE GRANULOCYTES #: 0.1 K/UL
LYMPHOCYTES ABSOLUTE: 2.6 K/UL (ref 1.1–4.5)
LYMPHOCYTES RELATIVE PERCENT: 14.9 % (ref 20–40)
MCH RBC QN AUTO: 31.5 PG (ref 27–31)
MCHC RBC AUTO-ENTMCNC: 32.8 G/DL (ref 33–37)
MCV RBC AUTO: 96 FL (ref 80–94)
MONOCYTES ABSOLUTE: 2 K/UL (ref 0–0.9)
MONOCYTES RELATIVE PERCENT: 11.5 % (ref 0–10)
NEUTROPHILS ABSOLUTE: 11.9 K/UL (ref 1.5–7.5)
NEUTROPHILS RELATIVE PERCENT: 69.3 % (ref 50–65)
PDW BLD-RTO: 13.2 % (ref 11.5–14.5)
PLATELET # BLD: 467 K/UL (ref 130–400)
PMV BLD AUTO: 8.8 FL (ref 9.4–12.4)
POTASSIUM SERPL-SCNC: 3.7 MMOL/L (ref 3.5–5)
RBC # BLD: 4.03 M/UL (ref 4.7–6.1)
SODIUM BLD-SCNC: 140 MMOL/L (ref 136–145)
TOTAL PROTEIN: 6.5 G/DL (ref 6.6–8.7)
WBC # BLD: 17.1 K/UL (ref 4.8–10.8)

## 2019-12-06 PROCEDURE — 86803 HEPATITIS C AB TEST: CPT

## 2019-12-06 PROCEDURE — 6370000000 HC RX 637 (ALT 250 FOR IP): Performed by: INTERNAL MEDICINE

## 2019-12-06 PROCEDURE — 97161 PT EVAL LOW COMPLEX 20 MIN: CPT

## 2019-12-06 PROCEDURE — 82595 ASSAY OF CRYOGLOBULIN: CPT

## 2019-12-06 PROCEDURE — 80053 COMPREHEN METABOLIC PANEL: CPT

## 2019-12-06 PROCEDURE — 85025 COMPLETE CBC W/AUTO DIFF WBC: CPT

## 2019-12-06 PROCEDURE — 36415 COLL VENOUS BLD VENIPUNCTURE: CPT

## 2019-12-06 PROCEDURE — 87389 HIV-1 AG W/HIV-1&-2 AB AG IA: CPT

## 2019-12-06 RX ADMIN — ROPINIROLE HYDROCHLORIDE 0.5 MG: 0.25 TABLET, FILM COATED ORAL at 08:53

## 2019-12-07 LAB — HIV 1,2 COMBO ANTIGEN/ANTIBODY: NEGATIVE

## 2019-12-08 LAB — LYME, EIA: 0.48 LIV (ref 0–1.2)

## 2019-12-09 ENCOUNTER — TELEPHONE (OUTPATIENT)
Dept: INTERNAL MEDICINE | Age: 54
End: 2019-12-09

## 2019-12-09 LAB
BLOOD CULTURE, ROUTINE: NORMAL
CULTURE, BLOOD 2: NORMAL

## 2019-12-10 ENCOUNTER — OFFICE VISIT (OUTPATIENT)
Dept: INTERNAL MEDICINE | Age: 54
End: 2019-12-10
Payer: COMMERCIAL

## 2019-12-10 VITALS
OXYGEN SATURATION: 98 % | DIASTOLIC BLOOD PRESSURE: 76 MMHG | RESPIRATION RATE: 20 BRPM | WEIGHT: 193 LBS | SYSTOLIC BLOOD PRESSURE: 130 MMHG | HEIGHT: 71 IN | HEART RATE: 96 BPM | BODY MASS INDEX: 27.02 KG/M2

## 2019-12-10 DIAGNOSIS — M19.90 ARTHRITIS: Primary | ICD-10-CM

## 2019-12-10 LAB — CRYOGLOBULIN, QUALITATIVE: NORMAL

## 2019-12-10 PROCEDURE — 99495 TRANSJ CARE MGMT MOD F2F 14D: CPT | Performed by: NURSE PRACTITIONER

## 2019-12-10 PROCEDURE — 1111F DSCHRG MED/CURRENT MED MERGE: CPT | Performed by: NURSE PRACTITIONER

## 2019-12-11 ENCOUNTER — TELEPHONE (OUTPATIENT)
Dept: INTERNAL MEDICINE | Age: 54
End: 2019-12-11

## 2019-12-11 RX ORDER — NAPROXEN 500 MG/1
500 TABLET ORAL 2 TIMES DAILY WITH MEALS
Qty: 60 TABLET | Refills: 3 | Status: SHIPPED | OUTPATIENT
Start: 2019-12-11 | End: 2020-02-17

## 2019-12-13 DIAGNOSIS — D75.839 THROMBOCYTOSIS: Primary | ICD-10-CM

## 2020-01-08 ENCOUNTER — HOSPITAL ENCOUNTER (OUTPATIENT)
Dept: INFUSION THERAPY | Age: 55
End: 2020-01-08

## 2020-01-21 NOTE — PROGRESS NOTES
93.9, MCHC 33.1, PLT of 595,000. CMP performed the same day was within normal range. He does have a history of smoking 1.5 pack per day cigarettes since he was 13years old. His initial CBC in the office on 6/14/2019 revealed a WBC of 14.09 with normal percent differential, Hgb 15.6, MCV 94.8, PLT of 375,000. He has leukocytosis and thrombocytosis could very likely from his history of splenectomy. He does however chronically abuse tobacco.     He did not return for planned outpatient labs but did return to the office again on 11/27/2019 after his insurance became effective. CBC on 1/27/2019 revealed a WBC of 13.33 with normal percent differential, Hgb 16.4, MCV 95, ,000    Serology 11/27/2019  NAILA 2 - [id number] negative  CALR mutation - negative   NAILA 2 Exons 12-15 - negative  MPL - negative    B12 - 452  Folate - 13.1  Copper - 91  Zinc - 92  LDH - 151          Past Medical History:   Diagnosis Date    Arthritis     Back pain     Elevated white blood cell count, unspecified     Gout     History of blood transfusion     Hypertension     Nicotine dependence, unspecified, uncomplicated         Past Surgical History:   Procedure Laterality Date    ABDOMEN SURGERY      APPENDECTOMY      CYST INCISION AND DRAINAGE Left     Hip-MRSA    DENTAL SURGERY      Total dental extraction    FOREIGN BODY REMOVAL N/A 6/24/2019    RECTAL FOREIGN BODY REMOVAL performed by Damaris Yang DO at [address] [place] [address]    ?            Current Outpatient Medications:     predniSONE (DELTASONE) 5 MG tablet, , Disp: , Rfl:     folic acid (FOLVITE) 1 MG tablet, , Disp: , Rfl:     methotrexate (RHEUMATREX) 2.5 MG chemo tablet, , Disp: , Rfl:     rOPINIRole (REQUIP) 0.5 MG tablet, Take 1 tablet by mouth 3 times daily, Disp: 90 tablet, Rfl: 3    simvastatin (ZOCOR) 20 MG tablet, Take 1 tablet by mouth nightly, Disp: 90 tablet, Rfl: 1    naproxen (NAPROSYN) 500 MG tablet, Take 1 tablet by mouth 2 times daily (with meals) (Patient not taking: Reported on 1/23/2020), Disp: 60 tablet, Rfl: 3     Allergies   Allergen Reactions    Aspirin      PAtient is not to take ASA r/t  No spleen     Ativan [Lorazepam] Other (See Comments)     Altered mental status    Codeine Other (See Comments)     Mental changes  .  Penicillins     Morphine Anxiety       Social History     Tobacco Use    Smoking status: Current Every Day Smoker     Packs/day: 1.00     Years: 41.00     Pack years: 41.00     Types: Cigarettes    Smokeless tobacco: Current User   Substance Use Topics    Alcohol use: No    Drug use: No       Family History   Problem Relation Age of Onset    Diabetes Father     Arthritis Father     Stroke Father     Colon Cancer Maternal Grandfather        Subjective   REVIEW OF SYSTEMS:   Review of Systems   Constitutional: Negative for chills, diaphoresis, fatigue, fever and unexpected weight change. HENT: Positive for voice change (Hoarseness for 1-1/2 years). Negative for mouth sores, nosebleeds, sore throat and trouble swallowing. Eyes: Negative for photophobia, discharge and itching. Respiratory: Positive for cough (Nonproductive for 8 months). Negative for shortness of breath and wheezing. Cardiovascular: Negative for chest pain, palpitations and leg swelling. Gastrointestinal: Negative for abdominal distention, abdominal pain, blood in stool, constipation, diarrhea, nausea and vomiting. Endocrine: Negative for cold intolerance, heat intolerance, polydipsia and polyuria. Genitourinary: Negative for difficulty urinating, dysuria, hematuria and urgency. Musculoskeletal: Positive for arthralgias and back pain. Negative for joint swelling and myalgias. Skin: Negative for color change and rash. Neurological: Negative for dizziness, tremors, seizures, syncope and light-headedness. Hematological: Negative for adenopathy. Does not bruise/bleed easily. [facility] as I had requested. That will be performed today. If it is negative then his leukocytosis and thrombocytosis are most likely related to his splenectomy. Since he has been on methotrexate his platelet count today is actually normal at 335 with a WBC of 13.15. Hgb is 15.    4. Chronic tobacco abuse - > 45-pack-year smoker    He had reported a worsening cough for 8 months and hoarseness for a year and a half at his last visit. CT chest with contrast 12/3/2019 at [facility] was negative for any acute process. 5.  Colon cancer screening. He  wants hold off on colonoscopy for now. 6.  Prostate cancer screening. PSA on 1/15/29 was 1.57    Orders Placed This Encounter   Procedures    Miscellaneous Sendout 1        Return in about 6 months (around 7/23/2020) for With Carlos A Trevino.      Gaurang Leggett PA-C  9:20 AM  1/23/2020

## 2020-01-23 ENCOUNTER — OFFICE VISIT (OUTPATIENT)
Dept: HEMATOLOGY | Age: 55
End: 2020-01-23
Payer: COMMERCIAL

## 2020-01-23 ENCOUNTER — HOSPITAL ENCOUNTER (OUTPATIENT)
Dept: INFUSION THERAPY | Age: 55
Discharge: HOME | End: 2020-01-23
Payer: COMMERCIAL

## 2020-01-23 VITALS
HEART RATE: 69 BPM | OXYGEN SATURATION: 98 % | DIASTOLIC BLOOD PRESSURE: 88 MMHG | HEIGHT: 71 IN | WEIGHT: 205.9 LBS | BODY MASS INDEX: 28.82 KG/M2 | SYSTOLIC BLOOD PRESSURE: 140 MMHG

## 2020-01-23 DIAGNOSIS — D75.839 THROMBOCYTOSIS: ICD-10-CM

## 2020-01-23 DIAGNOSIS — D72.829 LEUKOCYTOSIS, UNSPECIFIED TYPE: ICD-10-CM

## 2020-01-23 PROCEDURE — 99211 OFF/OP EST MAY X REQ PHY/QHP: CPT

## 2020-01-23 PROCEDURE — 85025 COMPLETE CBC W/AUTO DIFF WBC: CPT

## 2020-01-23 PROCEDURE — 99213 OFFICE O/P EST LOW 20 MIN: CPT | Performed by: PHYSICIAN ASSISTANT

## 2020-01-23 RX ORDER — PREDNISONE 1 MG/1
TABLET ORAL
COMMUNITY
Start: 2020-01-19 | End: 2021-07-20

## 2020-01-23 RX ORDER — FOLIC ACID 1 MG/1
TABLET ORAL
COMMUNITY
Start: 2020-01-16 | End: 2022-02-09 | Stop reason: SDUPTHER

## 2020-01-23 ASSESSMENT — ENCOUNTER SYMPTOMS
COLOR CHANGE: 0
ABDOMINAL DISTENTION: 0
SORE THROAT: 0
SHORTNESS OF BREATH: 0
NAUSEA: 0
TROUBLE SWALLOWING: 0
EYE DISCHARGE: 0
COUGH: 1
CONSTIPATION: 0
DIARRHEA: 0
VOICE CHANGE: 1
PHOTOPHOBIA: 0
WHEEZING: 0
VOMITING: 0
EYE ITCHING: 0
ABDOMINAL PAIN: 0
BLOOD IN STOOL: 0
BACK PAIN: 1

## 2020-01-29 RX ORDER — LISINOPRIL 5 MG/1
5 TABLET ORAL DAILY
Qty: 90 TABLET | Refills: 2 | Status: SHIPPED | OUTPATIENT
Start: 2020-01-29 | End: 2020-02-17 | Stop reason: SDUPTHER

## 2020-02-17 ENCOUNTER — OFFICE VISIT (OUTPATIENT)
Dept: INTERNAL MEDICINE | Age: 55
End: 2020-02-17
Payer: COMMERCIAL

## 2020-02-17 VITALS
SYSTOLIC BLOOD PRESSURE: 138 MMHG | BODY MASS INDEX: 29.26 KG/M2 | WEIGHT: 209 LBS | HEIGHT: 71 IN | HEART RATE: 72 BPM | DIASTOLIC BLOOD PRESSURE: 82 MMHG | OXYGEN SATURATION: 96 %

## 2020-02-17 PROCEDURE — 99214 OFFICE O/P EST MOD 30 MIN: CPT | Performed by: NURSE PRACTITIONER

## 2020-02-17 RX ORDER — LISINOPRIL 10 MG/1
10 TABLET ORAL DAILY
Qty: 90 TABLET | Refills: 1 | Status: SHIPPED | OUTPATIENT
Start: 2020-02-17 | End: 2020-08-06

## 2020-02-17 RX ORDER — SIMVASTATIN 20 MG
20 TABLET ORAL NIGHTLY
Qty: 90 TABLET | Refills: 1 | Status: SHIPPED | OUTPATIENT
Start: 2020-02-17 | End: 2020-08-06

## 2020-02-17 ASSESSMENT — ENCOUNTER SYMPTOMS
ABDOMINAL PAIN: 0
EYE ITCHING: 0
DIARRHEA: 0
SHORTNESS OF BREATH: 0
TROUBLE SWALLOWING: 0
ABDOMINAL DISTENTION: 0
COLOR CHANGE: 0
CONSTIPATION: 0
STRIDOR: 0
VOMITING: 0
SORE THROAT: 0
NAUSEA: 0
BLOOD IN STOOL: 0
WHEEZING: 0
EYE DISCHARGE: 0
COUGH: 0
CHOKING: 0

## 2020-02-17 ASSESSMENT — PATIENT HEALTH QUESTIONNAIRE - PHQ9
SUM OF ALL RESPONSES TO PHQ9 QUESTIONS 1 & 2: 0
SUM OF ALL RESPONSES TO PHQ QUESTIONS 1-9: 0
SUM OF ALL RESPONSES TO PHQ QUESTIONS 1-9: 0
1. LITTLE INTEREST OR PLEASURE IN DOING THINGS: 0
2. FEELING DOWN, DEPRESSED OR HOPELESS: 0

## 2020-02-17 NOTE — PROGRESS NOTES
Prediabetic)  05/14/2020    Lipid screen  05/14/2020    Potassium monitoring  12/06/2020    Creatinine monitoring  12/06/2020    Hepatitis C screen  Completed    HIV screen  Completed    Hepatitis A vaccine  Aged Out    Hepatitis B vaccine  Aged Out       Lab Results   Component Value Date    LABA1C 5.7 05/14/2019     Lab Results   Component Value Date    PSA 1.57 05/14/2019     TSH   Date Value Ref Range Status   05/14/2019 2.360 0.270 - 4.200 uIU/mL Final   ]  Lab Results   Component Value Date     12/06/2019    K 3.7 12/06/2019     12/06/2019    CO2 25 12/06/2019    BUN 13 12/06/2019    CREATININE 0.6 12/06/2019    GLUCOSE 120 (H) 12/06/2019    CALCIUM 8.8 12/06/2019    PROT 6.5 (L) 12/06/2019    LABALBU 3.1 (L) 12/06/2019    BILITOT 0.3 12/06/2019    ALKPHOS 118 12/06/2019    AST 19 12/06/2019    ALT 30 12/06/2019    LABGLOM >60 12/06/2019     Lab Results   Component Value Date    CHOL 232 (H) 05/14/2019     Lab Results   Component Value Date    TRIG 220 (H) 05/14/2019     Lab Results   Component Value Date    HDL 48 (L) 05/14/2019     Lab Results   Component Value Date    LDLCALC 140 05/14/2019     Lab Results   Component Value Date     12/06/2019     03/07/2011    K 3.7 12/06/2019    K 4.3 10/06/2019    K 4.5 03/07/2011     12/06/2019     03/07/2011    CO2 25 12/06/2019    BUN 13 12/06/2019    CREATININE 0.6 12/06/2019    CREATININE 0.8 03/07/2011    GLUCOSE 120 12/06/2019    CALCIUM 8.8 12/06/2019      Lab Results   Component Value Date    WBC 17.1 (H) 12/06/2019    HGB 12.7 (L) 12/06/2019    HCT 38.7 (L) 12/06/2019    MCV 96.0 (H) 12/06/2019     (H) 12/06/2019    LABLYMP 3.86 03/07/2011    LYMPHOPCT 14.9 (L) 12/06/2019    RBC 4.03 (L) 12/06/2019    MCH 31.5 (H) 12/06/2019    MCHC 32.8 (L) 12/06/2019    RDW 13.2 12/06/2019     Lab Results   Component Value Date    VITD25 16.1 (L) 05/14/2019       Subjective:      Review of Systems   Constitutional: Negative for fatigue, fever and unexpected weight change. HENT: Negative for ear discharge, ear pain, mouth sores, sore throat and trouble swallowing. Eyes: Negative for discharge, itching and visual disturbance. Respiratory: Negative for cough, choking, shortness of breath, wheezing and stridor. Cardiovascular: Negative for chest pain, palpitations and leg swelling. Gastrointestinal: Negative for abdominal distention, abdominal pain, blood in stool, constipation, diarrhea, nausea and vomiting. Endocrine: Negative for cold intolerance, polydipsia and polyuria. Genitourinary: Negative for difficulty urinating, dysuria, frequency and urgency. Musculoskeletal: Positive for arthralgias and joint swelling. Negative for gait problem. Ra     Skin: Negative for color change and rash. Allergic/Immunologic: Negative for food allergies and immunocompromised state. Neurological: Negative for dizziness, tremors, syncope, speech difficulty, weakness and headaches. Rla   Hematological: Negative for adenopathy. Does not bruise/bleed easily. Psychiatric/Behavioral: Negative for confusion and hallucinations. Objective:     Physical Exam  Constitutional:       General: He is not in acute distress. Appearance: He is well-developed. HENT:      Head: Normocephalic and atraumatic. Eyes:      General: No scleral icterus. Right eye: No discharge. Left eye: No discharge. Pupils: Pupils are equal, round, and reactive to light. Neck:      Musculoskeletal: Normal range of motion and neck supple. Thyroid: No thyromegaly. Vascular: No JVD. Cardiovascular:      Rate and Rhythm: Normal rate and regular rhythm. Heart sounds: Normal heart sounds. No murmur. Pulmonary:      Effort: Pulmonary effort is normal. No respiratory distress. Breath sounds: Normal breath sounds. No wheezing or rales.    Abdominal:      General: Bowel sounds are normal. There is no distension. Palpations: Abdomen is soft. There is no mass. Tenderness: There is no abdominal tenderness. There is no guarding or rebound. Musculoskeletal: Normal range of motion. General: No tenderness. Comments: Both of his hands are swollen and erythematous the left hand is warm to touch she is having a lot of discomfort in the second and third digits of his left hand. Skin:     General: Skin is warm and dry. Findings: No erythema or rash. Neurological:      Mental Status: He is alert and oriented to person, place, and time. Cranial Nerves: No cranial nerve deficit. Coordination: Coordination normal.      Deep Tendon Reflexes: Reflexes are normal and symmetric. Reflexes normal.   Psychiatric:         Mood and Affect: Mood is not depressed. Behavior: Behavior normal.         Thought Content: Thought content normal.         Judgment: Judgment normal.       /82   Pulse 72   Ht 5' 11\" (1.803 m)   Wt 209 lb (94.8 kg)   SpO2 96%   BMI 29.15 kg/m²     Assessment:       Diagnosis Orders   1. 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 Secondary hypertension     3. Thrombocytosis ([facility]     4. Restless leg syndrome         Plan:        Patient given educational materials - see patient instructions. Discussed use, benefit, and side effects of prescribed medications. Allpatient questions answered. Pt voiced understanding. Reviewed health maintenance. Instructed to continue current medications, diet and exercise. Patient agreed with treatment plan. Follow up as directed.    MEDICATIONS:  Orders Placed This Encounter   Medications    simvastatin (ZOCOR) 20 MG tablet     Sig: Take 1 tablet by mouth nightly     Dispense:  90 tablet     Refill:  1    lisinopril (PRINIVIL;ZESTRIL) 10 MG tablet     Sig: Take 1 tablet by mouth daily     Dispense:  90 tablet     Refill:  1         ORDERS:  No orders of the defined types were placed in this encounter. Follow-up:  No follow-ups on file. PATIENT INSTRUCTIONS:  There are no Patient Instructions on file for this visit. Electronically signed by DEMETRIS Reynaga on 2/17/2020 at 4:33 PM    EMRDragon/transcription disclaimer:  Much of this encounter note is electronic transcription/translation of spoken language to printed texts. The electronic translation of spoken language may be erroneous, or at times,nonsensical words or phrases may be inadvertently transcribed.   Although I have reviewed the note for such errors, some may still exist.

## 2020-02-17 NOTE — PATIENT INSTRUCTIONS
Neuritis continue with the methotrexate and steroid  2. Secondary hypertension we will have him on lisinopril 10 a new prescription has been sent in  3. Thrombocytosis await results from hematology  4.   Restless leg syndrome continue with Requip

## 2020-02-18 RX ORDER — ROPINIROLE 0.5 MG/1
0.5 TABLET, FILM COATED ORAL 3 TIMES DAILY
Qty: 90 TABLET | Refills: 1 | Status: SHIPPED | OUTPATIENT
Start: 2020-02-18 | End: 2020-04-09

## 2020-03-05 RX ORDER — LEVOFLOXACIN 500 MG/1
500 TABLET, FILM COATED ORAL DAILY
Qty: 7 TABLET | Refills: 0 | Status: SHIPPED | OUTPATIENT
Start: 2020-03-05 | End: 2020-03-12

## 2020-04-09 RX ORDER — ROPINIROLE 0.5 MG/1
TABLET, FILM COATED ORAL
Qty: 90 TABLET | Refills: 1 | Status: SHIPPED | OUTPATIENT
Start: 2020-04-09 | End: 2021-07-20

## 2020-08-06 RX ORDER — LISINOPRIL 10 MG/1
TABLET ORAL
Qty: 30 TABLET | Refills: 5 | Status: SHIPPED | OUTPATIENT
Start: 2020-08-06 | End: 2021-02-22

## 2020-08-06 RX ORDER — SIMVASTATIN 20 MG
TABLET ORAL
Qty: 30 TABLET | Refills: 5 | Status: SHIPPED | OUTPATIENT
Start: 2020-08-06 | End: 2021-02-22

## 2020-08-06 NOTE — TELEPHONE ENCOUNTER
Omari called requesting a refill of the below medication which has been pended for you:     Requested Prescriptions     Pending Prescriptions Disp Refills    simvastatin (ZOCOR) 20 MG tablet [Pharmacy Med Name: SIMVASTATIN 20 MG TABLET] 30 tablet 5     Sig: TAKE 1 TABLET BY MOUTH EVERY DAY AT NIGHT    lisinopril (PRINIVIL;ZESTRIL) 10 MG tablet [Pharmacy Med Name: LISINOPRIL 10 MG TABLET] 30 tablet 5     Sig: TAKE 1 TABLET BY MOUTH EVERY DAY       Last Appointment Date: 5/18/2020  Next Appointment Date: Visit date not found    Allergies   Allergen Reactions    Aspirin      PAtient is not to take ASA r/t  No spleen     Ativan [Lorazepam] Other (See Comments)     Altered mental status    Codeine Other (See Comments)     Mental changes  .      Penicillins     Morphine Anxiety

## 2020-09-22 ENCOUNTER — NURSE TRIAGE (OUTPATIENT)
Dept: OTHER | Facility: CLINIC | Age: 55
End: 2020-09-22

## 2020-09-22 NOTE — TELEPHONE ENCOUNTER
Reason for Disposition   [1] MILD difficulty breathing (e.g., minimal/no SOB at rest, SOB with walking, pulse <100) AND [2] NEW-onset or WORSE than normal    Answer Assessment - Initial Assessment Questions  1. RESPIRATORY STATUS: \"Describe your breathing? \" (e.g., wheezing, shortness of breath, unable to speak, severe coughing)      He has frequent coughing , which is causing sob  2. ONSET: \"When did this breathing problem begin? \"       2 weeks ago . 3. PATTERN \"Does the difficult breathing come and go, or has it been constant since it started? \"       The cough is consistent  4. SEVERITY: \"How bad is your breathing? \" (e.g., mild, moderate, severe)     - MILD: No SOB at rest, mild SOB with walking, speaks normally in sentences, can lay down, no retractions, pulse < 100.     - MODERATE: SOB at rest, SOB with minimal exertion and prefers to sit, cannot lie down flat, speaks in phrases, mild retractions, audible wheezing, pulse 100-120.     - SEVERE: Very SOB at rest, speaks in single words, struggling to breathe, sitting hunched forward, retractions, pulse > 120      Breathing is more difficult with exertion. Sob right now , but he is up moving around right now. He is able to speak in full sentences. 5. RECURRENT SYMPTOM: \"Have you had difficulty breathing before? \" If so, ask: \"When was the last time? \" and \"What happened that time? \"       No , never before  6. CARDIAC HISTORY: \"Do you have any history of heart disease? \" (e.g., heart attack, angina, bypass surgery, angioplasty)       no  7. LUNG HISTORY: \"Do you have any history of lung disease? \"  (e.g., pulmonary embolus, asthma, emphysema)      Pna, quit smoking 2 mos ago  8. CAUSE: \"What do you think is causing the breathing problem? \"       Sinuses or allergies?per pt  9. OTHER SYMPTOMS: \"Do you have any other symptoms? (e.g., dizziness, runny nose, cough, chest pain, fever)      Unknown color or phlegm . No dizziness, no cp.   Cough is present and won't go

## 2020-09-23 ENCOUNTER — VIRTUAL VISIT (OUTPATIENT)
Dept: INTERNAL MEDICINE | Age: 55
End: 2020-09-23
Payer: COMMERCIAL

## 2020-09-23 PROCEDURE — 99442 PR PHYS/QHP TELEPHONE EVALUATION 11-20 MIN: CPT | Performed by: NURSE PRACTITIONER

## 2020-09-23 RX ORDER — METHYLPREDNISOLONE 4 MG/1
TABLET ORAL
Qty: 1 KIT | Refills: 0 | Status: SHIPPED | OUTPATIENT
Start: 2020-09-23 | End: 2020-09-29

## 2020-09-23 RX ORDER — GUAIFENESIN AND CODEINE PHOSPHATE 100; 10 MG/5ML; MG/5ML
5 SOLUTION ORAL EVERY 4 HOURS PRN
Qty: 118 ML | Refills: 0 | Status: SHIPPED | OUTPATIENT
Start: 2020-09-23 | End: 2020-09-26

## 2020-09-23 RX ORDER — LEVOFLOXACIN 500 MG/1
500 TABLET, FILM COATED ORAL DAILY
Qty: 7 TABLET | Refills: 0 | Status: SHIPPED | OUTPATIENT
Start: 2020-09-23 | End: 2020-09-30

## 2020-09-23 ASSESSMENT — ENCOUNTER SYMPTOMS
TROUBLE SWALLOWING: 0
EYE ITCHING: 0
SORE THROAT: 0
COUGH: 1
SHORTNESS OF BREATH: 1
NAUSEA: 0
COLOR CHANGE: 0
ABDOMINAL PAIN: 0
EYE DISCHARGE: 0
WHEEZING: 0
BLOOD IN STOOL: 0
CHOKING: 0
VOMITING: 0
STRIDOR: 0
DIARRHEA: 0
CONSTIPATION: 0
ABDOMINAL DISTENTION: 0

## 2020-09-23 NOTE — PATIENT INSTRUCTIONS
1.  Acute bronchitis continue to use the nebulizer 3-4 times a day. I will send you Levaquin 500 daily for 7 days in addition to a Medrol Dosepak. Continues Mucinex 1200 mg twice a day with lots of water of also said she Cheratussin cough syrup this does have codeine in it.   We have listed that as an allergy but advised me over the phone that you can use it  You can use Delsym during the daytime

## 2020-09-23 NOTE — PROGRESS NOTES
[address] [facility]  [address] [address] [address]  [address] [address]  Dept: [phone]  Dept Fax: [phone]: [phone]    Samuel Lew is a 60 y.o. male who were are performing tele health communication  for his medical conditions/complaints as noted below. Currently, our state is under umbrella of national state of emergency and we are using alternative methods of taking care of the needs of our patients so they are not exposed in our office; The patient has consented to this form of communication  Samuel Lew is c/sharon   Cough    THE patient is at home   [facility] is at office       HPI:     HPI   1. . cough; that she just cant shake  ; he has had cough for a couple of weeks    Has taste ; He thinks he has pneumonia; He has been sick for a couple of weeks; He has been using a nebulizer and delsym and mucinex;  ;  Just not getting any better; He has stopped smoking   Chief Complaint   Patient presents with    Cough       Past Medical History:   Diagnosis Date    Arthritis     Back pain     Elevated white blood cell count, unspecified     Gout     History of blood transfusion     Hypertension     Nicotine dependence, unspecified, uncomplicated       Past Surgical History:   Procedure Laterality Date    ABDOMEN SURGERY      APPENDECTOMY      CYST INCISION AND DRAINAGE Left     Hip-MRSA    DENTAL SURGERY      Total dental extraction    FOREIGN BODY REMOVAL N/A 6/24/2019    RECTAL FOREIGN BODY REMOVAL performed by Benny Duron,  at [address] [place] [address] Road    ?        Vitals 2/17/2020 1/23/2020 12/10/2019 12/6/2019 12/6/2019 05/5/3590   SYSTOLIC 501 369 114 858 - 708   DIASTOLIC 82 88 76 80 - 81   Pulse 72 69 96 87 - 81   Temp - - - 97.9 - 98.6   Resp - - 20 20 - 18   SpO2 96 98 98 93 - 91   Weight 209 lb 205 lb 14.4 oz 193 lb - 209 lb 6.4 oz -   Height 5' 11\" 5' 11\" 5' 11\" - - -   BMI (wt*703/ht~2) 29.15 kg/m2 28.71 kg/m2 26.92 kg/m2 - - -   Pain Level - - - - - -   Some recent data might be hidden       Family History   Problem Relation Age of Onset    Diabetes Father     Arthritis Father     Stroke Father     Colon Cancer Maternal Grandfather        Social History     Tobacco Use    Smoking status: Current Every Day Smoker     Packs/day: 1.00     Years: 41.00     Pack years: 41.00     Types: Cigarettes    Smokeless tobacco: Current User   Substance Use Topics    Alcohol use: No      Current Outpatient Medications   Medication Sig Dispense Refill    guaiFENesin-codeine (CHERATUSSIN AC) 100-10 MG/5ML syrup Take 5 mLs by mouth every 4 hours as needed for Cough for up to 3 days. 118 mL 0    levoFLOXacin (LEVAQUIN) 500 MG tablet Take 1 tablet by mouth daily for 7 days 7 tablet 0    methylPREDNISolone (MEDROL DOSEPACK) 4 MG tablet Take by mouth. 1 kit 0    simvastatin (ZOCOR) 20 MG tablet TAKE 1 TABLET BY MOUTH EVERY DAY AT NIGHT 30 tablet 5    lisinopril (PRINIVIL;ZESTRIL) 10 MG tablet TAKE 1 TABLET BY MOUTH EVERY DAY 30 tablet 5    rOPINIRole (REQUIP) 0.5 MG tablet TAKE 1 TABLET BY MOUTH THREE TIMES A DAY 90 tablet 1    predniSONE (DELTASONE) 5 MG tablet       folic acid (FOLVITE) 1 MG tablet       methotrexate (RHEUMATREX) 2.5 MG chemo tablet        No current facility-administered medications for this visit. 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      Penicillins     Morphine Anxiety       Health Maintenance   Topic Date Due    Hib vaccine (1 of 1 - Risk 1-dose series) [birth year]/1964    DTaP/Tdap/Td vaccine (1 - Tdap) 01/18/1982    Shingles Vaccine (1 of 2) 01/18/2013    Colon cancer screen colonoscopy  01/18/2013    Low dose CT lung screening  01/18/2018    A1C test (Diabetic or Prediabetic)  05/14/2020    Lipid screen  05/14/2020    Flu vaccine (1) 09/01/2020    Meningococcal (ACWY) vaccine (1 - Risk start before 7 months 4-dose series) 02/08/2021 (Originally 1965)    Meningococcal B vaccine (1 of 4 - Increased Risk Bexsero 2-dose series) 02/08/2021 (Originally 1/18/1975)    Pneumococcal 0-64 years Vaccine (1 of 3 - PCV13) 08/06/2035 (Originally 1/18/1969)    Potassium monitoring  12/06/2020    Creatinine monitoring  12/06/2020    Hepatitis C screen  Completed    HIV screen  Completed    Hepatitis A vaccine  Aged Out    Hepatitis B vaccine  Aged Out       Lab Results   Component Value Date    LABA1C 5.7 05/14/2019     Lab Results   Component Value Date    PSA 1.57 05/14/2019     TSH   Date Value Ref Range Status   05/14/2019 2.360 0.270 - 4.200 uIU/mL Final   ]  Lab Results   Component Value Date     12/06/2019    K 3.7 12/06/2019     12/06/2019    CO2 25 12/06/2019    BUN 13 12/06/2019    CREATININE 0.6 12/06/2019    GLUCOSE 120 (H) 12/06/2019    CALCIUM 8.8 12/06/2019    PROT 6.5 (L) 12/06/2019    LABALBU 3.1 (L) 12/06/2019    BILITOT 0.3 12/06/2019    ALKPHOS 118 12/06/2019    AST 19 12/06/2019    ALT 30 12/06/2019    LABGLOM >60 12/06/2019     Lab Results   Component Value Date    CHOL 232 (H) 05/14/2019     Lab Results   Component Value Date    TRIG 220 (H) 05/14/2019     Lab Results   Component Value Date    HDL 48 (L) 05/14/2019     Lab Results   Component Value Date    LDLCALC 140 05/14/2019     Lab Results   Component Value Date     12/06/2019     03/07/2011    K 3.7 12/06/2019    K 4.3 10/06/2019    K 4.5 03/07/2011     12/06/2019     03/07/2011    CO2 25 12/06/2019    BUN 13 12/06/2019    CREATININE 0.6 12/06/2019    CREATININE 0.8 03/07/2011    GLUCOSE 120 12/06/2019    CALCIUM 8.8 12/06/2019      Lab Results   Component Value Date    WBC 17.1 (H) 12/06/2019    HGB 12.7 (L) 12/06/2019    HCT 38.7 (L) 12/06/2019    MCV 96.0 (H) 12/06/2019     (H) instructions. Discussed use, benefit, and side effects of prescribed medications. Allpatient questions answered. Pt voiced understanding. Reviewed health maintenance. Instructed to continue current medications, diet and exercise. Patient agreed with treatment plan. Follow up as directed. MEDICATIONS:  Orders Placed This Encounter   Medications    guaiFENesin-codeine (CHERATUSSIN AC) 100-10 MG/5ML syrup     Sig: Take 5 mLs by mouth every 4 hours as needed for Cough for up to 3 days. Dispense:  118 mL     Refill:  0     Reduce doses taken as pain becomes manageable    levoFLOXacin (LEVAQUIN) 500 MG tablet     Sig: Take 1 tablet by mouth daily for 7 days     Dispense:  7 tablet     Refill:  0    methylPREDNISolone (MEDROL DOSEPACK) 4 MG tablet     Sig: Take by mouth. Dispense:  1 kit     Refill:  0         ORDERS:  No orders of the defined types were placed in this encounter. Follow-up:  No follow-ups on file. Following the remote visit today we will call you when we are available to reschedule your follow up appt      PATIENT INSTRUCTIONS:  Patient Instructions   1. Acute bronchitis continue to use the nebulizer 3-4 times a day. I will send you Levaquin 500 daily for 7 days in addition to a Medrol Dosepak. Continues Mucinex 1200 mg twice a day with lots of water of also said she Cheratussin cough syrup this does have codeine in it.   We have listed that as an allergy but advised me over the phone that you can use it  You can use Delsym during the daytime    Electronically signed by DEMETRIS Singh on 9/23/2020 at 4:14 PM   televisit 18minutes  We are communicating with [employer] for the 3 month fu for controlled substance      Pursuant to the emergency declaration under the [address] [place] [address] waiver authority and the [employer] Act, this Virtual Visit was conducted, with patient's consent, to reduce the patient's risk of exposure to COVID-19 and provide continuity of care for an established patient.        EMRDragon/transcription disclaimer:  Much of this encounter note is electronic

## 2020-12-01 ENCOUNTER — TELEPHONE (OUTPATIENT)
Dept: INTERNAL MEDICINE | Age: 55
End: 2020-12-01

## 2021-02-22 RX ORDER — SIMVASTATIN 20 MG
TABLET ORAL
Qty: 30 TABLET | Refills: 5 | Status: SHIPPED | OUTPATIENT
Start: 2021-02-22 | End: 2021-08-16

## 2021-02-22 RX ORDER — LISINOPRIL 10 MG/1
TABLET ORAL
Qty: 30 TABLET | Refills: 5 | Status: SHIPPED | OUTPATIENT
Start: 2021-02-22 | End: 2021-08-16

## 2021-02-22 NOTE — TELEPHONE ENCOUNTER
Omari called requesting a refill of the below medication which has been pended for you:     Requested Prescriptions     Pending Prescriptions Disp Refills    lisinopril (PRINIVIL;ZESTRIL) 10 MG tablet [Pharmacy Med Name: LISINOPRIL 10 MG TABLET] 30 tablet 5     Sig: TAKE 1 TABLET BY MOUTH EVERY DAY    simvastatin (ZOCOR) 20 MG tablet [Pharmacy Med Name: SIMVASTATIN 20 MG TABLET] 30 tablet 5     Sig: TAKE 1 TABLET BY MOUTH EVERY DAY AT NIGHT       Last Appointment Date: 1/27/2021  Next Appointment Date: Visit date not found    Allergies   Allergen Reactions    Aspirin      PAtient is not to take ASA r/t  No spleen     Ativan [Lorazepam] Other (See Comments)     Altered mental status    Codeine Other (See Comments)     Mental changes  .      Penicillins     Morphine Anxiety

## 2021-07-19 DIAGNOSIS — Z12.5 PROSTATE CANCER SCREENING: Primary | ICD-10-CM

## 2021-07-19 DIAGNOSIS — Z00.00 ANNUAL PHYSICAL EXAM: ICD-10-CM

## 2021-07-20 ENCOUNTER — OFFICE VISIT (OUTPATIENT)
Dept: INTERNAL MEDICINE | Age: 56
End: 2021-07-20

## 2021-07-20 VITALS
SYSTOLIC BLOOD PRESSURE: 136 MMHG | DIASTOLIC BLOOD PRESSURE: 74 MMHG | WEIGHT: 210 LBS | OXYGEN SATURATION: 97 % | BODY MASS INDEX: 30.06 KG/M2 | HEART RATE: 82 BPM | HEIGHT: 70 IN

## 2021-07-20 DIAGNOSIS — M00.852 ARTHRITIS OF LEFT HIP DUE TO OTHER BACTERIA (HCC): Primary | ICD-10-CM

## 2021-07-20 DIAGNOSIS — I15.9 SECONDARY HYPERTENSION: ICD-10-CM

## 2021-07-20 DIAGNOSIS — E55.9 VITAMIN D DEFICIENCY: ICD-10-CM

## 2021-07-20 DIAGNOSIS — Z00.00 WELL ADULT EXAM: ICD-10-CM

## 2021-07-20 DIAGNOSIS — E78.2 MIXED HYPERLIPIDEMIA: ICD-10-CM

## 2021-07-20 LAB
C-REACTIVE PROTEIN: 0.74 MG/DL (ref 0–0.5)
RHEUMATOID FACTOR: <10 IU/ML
SEDIMENTATION RATE, ERYTHROCYTE: 10 MM/HR (ref 0–15)

## 2021-07-20 PROCEDURE — 99214 OFFICE O/P EST MOD 30 MIN: CPT | Performed by: NURSE PRACTITIONER

## 2021-07-20 RX ORDER — ERGOCALCIFEROL 1.25 MG/1
50000 CAPSULE ORAL WEEKLY
Qty: 30 CAPSULE | Refills: 3 | Status: SHIPPED | OUTPATIENT
Start: 2021-07-20 | End: 2022-08-03 | Stop reason: SDUPTHER

## 2021-07-20 SDOH — ECONOMIC STABILITY: FOOD INSECURITY: WITHIN THE PAST 12 MONTHS, YOU WORRIED THAT YOUR FOOD WOULD RUN OUT BEFORE YOU GOT MONEY TO BUY MORE.: NEVER TRUE

## 2021-07-20 SDOH — ECONOMIC STABILITY: FOOD INSECURITY: WITHIN THE PAST 12 MONTHS, THE FOOD YOU BOUGHT JUST DIDN'T LAST AND YOU DIDN'T HAVE MONEY TO GET MORE.: NEVER TRUE

## 2021-07-20 ASSESSMENT — ENCOUNTER SYMPTOMS
BLOOD IN STOOL: 0
STRIDOR: 0
TROUBLE SWALLOWING: 0
SHORTNESS OF BREATH: 0
CHOKING: 0
NAUSEA: 0
COUGH: 0
ABDOMINAL PAIN: 0
SORE THROAT: 0
ABDOMINAL DISTENTION: 0
BACK PAIN: 1
VOMITING: 0
EYE ITCHING: 0
COLOR CHANGE: 0
DIARRHEA: 0
WHEEZING: 0
CONSTIPATION: 0
EYE DISCHARGE: 0

## 2021-07-20 ASSESSMENT — PATIENT HEALTH QUESTIONNAIRE - PHQ9
SUM OF ALL RESPONSES TO PHQ9 QUESTIONS 1 & 2: 0
1. LITTLE INTEREST OR PLEASURE IN DOING THINGS: 0
2. FEELING DOWN, DEPRESSED OR HOPELESS: 0
SUM OF ALL RESPONSES TO PHQ QUESTIONS 1-9: 0

## 2021-07-20 ASSESSMENT — SOCIAL DETERMINANTS OF HEALTH (SDOH): HOW HARD IS IT FOR YOU TO PAY FOR THE VERY BASICS LIKE FOOD, HOUSING, MEDICAL CARE, AND HEATING?: NOT HARD AT ALL

## 2021-07-20 NOTE — PATIENT INSTRUCTIONS
Right septic arthritis followed by Dr. Hughes Poor stable  2. Hypertension stable with lisinopril  3.   History of splenectomy  #5 thrombocytosis  #5 hyperlipidemia we will just monitor this with diet for now and recheck in 6 months continue Zocor

## 2021-07-20 NOTE — PROGRESS NOTES
Formerly [facility] PHYSICIAN SERVICES  [facility] INTERNAL MEDICINE  [address] [address] [place] [address]  Dept: [phone]  Dept Fax: [phone]: [phone]    Kingsley Sarabia (:  1965) is a 60 y.o. male,Established patient, here for evaluation of the following chief complaint(s): Follow-up (states he is doing ok)      Kingsley Sarabia is a 60 y.o. male who presents today for his medical conditions/complaints as noted below. Kingsley Sarabia is c/sharon Follow-up (states he is doing ok)        HPI:     Chief Complaint   Patient presents with    Follow-up     states he is doing ok     HPI   1.  arthritis septic; He is seeing Dr Vivienne Guerrero on methotrexate   2. Hypertension stable on lisinopril 10 mg daily no side effects of the meds  #4 history of splenectomy  3. History of thrombocytosis followed by hematology  Past Medical History:   Diagnosis Date    Arthritis     Back pain     Elevated white blood cell count, unspecified     Gout     History of blood transfusion     Hypertension     Mixed hyperlipidemia 2021    Nicotine dependence, unspecified, uncomplicated       Past Surgical History:   Procedure Laterality Date    ABDOMEN SURGERY      APPENDECTOMY      CYST INCISION AND DRAINAGE Left     Hip-MRSA    DENTAL SURGERY      Total dental extraction    FOREIGN BODY REMOVAL N/A 2019    RECTAL FOREIGN BODY REMOVAL performed by Huong Jamison DO at [address] [place] [address]    ?        Vitals 2021 2020 2020 12/10/2019 2019 9364   SYSTOLIC 803 148 225 442 332 -   DIASTOLIC 74 82 88 76 80 -   Pulse 82 72 69 96 87 -   Temp - - - - 97.9 -   Resp - - - 20 20 -   SpO2 97 96 98 98 93 -   Weight 210 lb 209 lb 205 lb 14.4 oz 193 lb - 209 lb 6.4 oz   Height 5' 10\" 5' 11\" 5' 11\" 5' 11\" - -   Body mass index 30.13 kg/m2 29.15 kg/m2 28.71 kg/m2 26.92 kg/m2 - -   Pain Level - - - - - -   Some recent data might be hidden       Family History   Problem Relation Age of Onset    Diabetes Father     Arthritis Father     Stroke Father     Colon Cancer Maternal Grandfather        Social History     Tobacco Use    Smoking status: Current Every Day Smoker     Packs/day: 1.00     Years: 41.00     Pack years: 41.00     Types: Cigarettes    Smokeless tobacco: Current User   Substance Use Topics    Alcohol use: No      Current Outpatient Medications   Medication Sig Dispense Refill    vitamin D (ERGOCALCIFEROL) 1.25 MG (45924 UT) CAPS capsule Take 1 capsule by mouth once a week 30 capsule 3    lisinopril (PRINIVIL;ZESTRIL) 10 MG tablet TAKE 1 TABLET BY MOUTH EVERY DAY 30 tablet 5    folic acid (FOLVITE) 1 MG tablet       methotrexate (RHEUMATREX) 2.5 MG chemo tablet       simvastatin (ZOCOR) 20 MG tablet TAKE 1 TABLET BY MOUTH EVERY DAY AT NIGHT 30 tablet 5     No current facility-administered medications for this visit. 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      Penicillins     Morphine Anxiety       Health Maintenance   Topic Date Due    Colon cancer screen colonoscopy  Never done    A1C test (Diabetic or Prediabetic)  05/14/2020    Meningococcal (ACWY) vaccine (1 - Risk start before 7 months 4-dose series) 07/20/2021 (Originally 1965)    COVID-19 Vaccine (1) 07/18/2022 (Originally 1/18/1975)    Hib vaccine (1 of 1 - Risk 1-dose series) 07/20/2022 (Originally 4/18/1966)    DTaP/Tdap/Td vaccine (1 - Tdap) 07/20/2022 (Originally 1/18/1982)    Shingles Vaccine (1 of 2) 07/20/2022 (Originally 1/18/2013)    Meningococcal B vaccine (1 of 4 - Increased Risk Bexsero 2-dose series) 07/20/2022 (Originally 1/18/1973)    Pneumococcal 0-64 years Vaccine (1 of 4 - PCV13) 08/06/2035 (Originally 1/18/1969)    Flu vaccine (1) 09/01/2021    Lipid screen  07/19/2022  Potassium monitoring  07/19/2022    Creatinine monitoring  07/19/2022    Hepatitis C screen  Completed    HIV screen  Completed    Hepatitis A vaccine  Aged Out    Hepatitis B vaccine  Aged Out    Low dose CT lung screening  Discontinued       Lab Results   Component Value Date    LABA1C 5.7 05/14/2019     Lab Results   Component Value Date    PSA 0.92 07/19/2021    PSA 1.57 05/14/2019     TSH   Date Value Ref Range Status   07/19/2021 1.460 0.270 - 4.200 uIU/mL Final   ]  Lab Results   Component Value Date     07/19/2021    K 4.4 07/19/2021    CL 99 07/19/2021    CO2 30 (H) 07/19/2021    BUN 16 07/19/2021    CREATININE 0.8 07/19/2021    GLUCOSE 127 (H) 07/19/2021    CALCIUM 9.8 07/19/2021    PROT 7.0 07/19/2021    LABALBU 4.1 07/19/2021    BILITOT <0.2 07/19/2021    ALKPHOS 111 07/19/2021    AST 19 07/19/2021    ALT 32 07/19/2021    LABGLOM >60 07/19/2021    GFRAA >59 07/19/2021     Lab Results   Component Value Date    CHOL 151 (L) 07/19/2021    CHOL 232 (H) 05/14/2019     Lab Results   Component Value Date    TRIG 208 (H) 07/19/2021    TRIG 220 (H) 05/14/2019     Lab Results   Component Value Date    HDL 43 (L) 07/19/2021    HDL 48 (L) 05/14/2019     Lab Results   Component Value Date    LDLCALC 66 07/19/2021    LDLCALC 140 05/14/2019     Lab Results   Component Value Date     07/19/2021     03/07/2011    K 4.4 07/19/2021    K 4.3 10/06/2019    K 4.5 03/07/2011    CL 99 07/19/2021     03/07/2011    CO2 30 07/19/2021    BUN 16 07/19/2021    CREATININE 0.8 07/19/2021    CREATININE 0.8 03/07/2011    GLUCOSE 127 07/19/2021    CALCIUM 9.8 07/19/2021      Lab Results   Component Value Date    WBC 11.3 (H) 07/19/2021    HGB 15.5 07/19/2021    HCT 48.4 07/19/2021    .0 (H) 07/19/2021     (H) 07/19/2021    LABLYMP 3.86 03/07/2011    LYMPHOPCT 27.2 07/19/2021    RBC 4.79 07/19/2021    MCH 32.4 (H) 07/19/2021    MCHC 32.0 (L) 07/19/2021    RDW 14.2 07/19/2021     Lab Results Component Value Date    VITD25 24.0 (L) 07/19/2021     Labs reviewed from 7/19/2021    Subjective:      Review of Systems   Constitutional: Negative for fatigue, fever and unexpected weight change. HENT: Negative for ear discharge, ear pain, mouth sores, sore throat and trouble swallowing. Eyes: Negative for discharge, itching and visual disturbance. Respiratory: Negative for cough, choking, shortness of breath, wheezing and stridor. Cardiovascular: Negative for chest pain, palpitations and leg swelling. Gastrointestinal: Negative for abdominal distention, abdominal pain, blood in stool, constipation, diarrhea, nausea and vomiting. Endocrine: Negative for cold intolerance, polydipsia and polyuria. Genitourinary: Negative for difficulty urinating, dysuria, frequency and urgency. Musculoskeletal: Positive for arthralgias and back pain. Negative for gait problem. Skin: Negative for color change and rash. Allergic/Immunologic: Negative for food allergies and immunocompromised state. Neurological: Negative for dizziness, tremors, syncope, speech difficulty, weakness and headaches. Hematological: Negative for adenopathy. Does not bruise/bleed easily. Psychiatric/Behavioral: Negative for confusion and hallucinations. Objective:     Physical Exam  Constitutional:       General: He is not in acute distress. Appearance: He is well-developed. HENT:      Head: Normocephalic and atraumatic. Eyes:      General: No scleral icterus. Right eye: No discharge. Left eye: No discharge. Pupils: Pupils are equal, round, and reactive to light. Neck:      Thyroid: No thyromegaly. Vascular: No JVD. Cardiovascular:      Rate and Rhythm: Normal rate and regular rhythm. Heart sounds: Normal heart sounds. No murmur heard. Pulmonary:      Effort: Pulmonary effort is normal. No respiratory distress. Breath sounds: Normal breath sounds. No wheezing or rales. diet and exercise. Patient agreed with treatment plan. Follow up as directed. MEDICATIONS:  Orders Placed This Encounter   Medications    vitamin D (ERGOCALCIFEROL) 1.25 MG (44013 UT) CAPS capsule     Sig: Take 1 capsule by mouth once a week     Dispense:  30 capsule     Refill:  3         ORDERS:  Orders Placed This Encounter   Procedures    Sedimentation Rate    C-Reactive Protein    Rheumatoid Factor    CBC Auto Differential    Comprehensive Metabolic Panel    Hemoglobin A1C    Lipid Panel    Vitamin D 25 Hydroxy    Urinalysis Reflex to Culture    TSH without Reflex       Follow-up:  Return in about 6 months (around 1/20/2022) for have labs done prior to appt. PATIENT INSTRUCTIONS:  Patient Instructions   Right septic arthritis followed by Dr. Zuleika castillo  2. Hypertension stable with lisinopril  3. History of splenectomy  #5 thrombocytosis  #5 hyperlipidemia we will just monitor this with diet for now and recheck in 6 months continue Zocor    Electronically signed by DEMETRIS Carroll on 7/20/2021 at 3:35 PM        EMRDragon/transcription disclaimer:  Much of this encounter note is electronic transcription/translation of spoken language to printed texts. The electronic translation of spoken language may be erroneous, or at times,nonsensical words or phrases may be inadvertently transcribed.   Although I have reviewed the note for such errors, some may still exist.

## 2021-08-16 RX ORDER — LISINOPRIL 10 MG/1
TABLET ORAL
Qty: 30 TABLET | Refills: 5 | Status: SHIPPED | OUTPATIENT
Start: 2021-08-16 | End: 2022-02-02

## 2021-08-16 RX ORDER — SIMVASTATIN 20 MG
TABLET ORAL
Qty: 30 TABLET | Refills: 5 | Status: SHIPPED | OUTPATIENT
Start: 2021-08-16 | End: 2022-02-02

## 2021-08-16 NOTE — TELEPHONE ENCOUNTER
Omari called requesting a refill of the below medication which has been pended for you:     Requested Prescriptions     Pending Prescriptions Disp Refills    lisinopril (PRINIVIL;ZESTRIL) 10 MG tablet [Pharmacy Med Name: LISINOPRIL 10 MG TABLET] 30 tablet 5     Sig: TAKE 1 TABLET BY MOUTH EVERY DAY    simvastatin (ZOCOR) 20 MG tablet [Pharmacy Med Name: SIMVASTATIN 20 MG TABLET] 30 tablet 5     Sig: TAKE 1 TABLET BY MOUTH EVERY DAY AT NIGHT       Last Appointment Date: 7/20/2021  Next Appointment Date: Visit date not found    Allergies   Allergen Reactions    Aspirin      PAtient is not to take ASA r/t  No spleen     Ativan [Lorazepam] Other (See Comments)     Altered mental status    Codeine Other (See Comments)     Mental changes  .      Penicillins     Morphine Anxiety

## 2021-11-24 ENCOUNTER — TELEPHONE (OUTPATIENT)
Dept: INTERNAL MEDICINE | Age: 56
End: 2021-11-24

## 2021-11-24 DIAGNOSIS — Z00.00 ANNUAL PHYSICAL EXAM: Primary | ICD-10-CM

## 2021-11-24 NOTE — TELEPHONE ENCOUNTER
Katya Tellez  : 1963  Payor: [employer] [facility], [address] [place] [address] / Plan: [place] PLANNED Rommel Villalba. / Product Type: Commerical /  [address]  at [facility] DULCE ANGUIANO  [address] [facility], [address], [place] [address].  [phone]   [phone]       OUTPATIENT PHYSICAL THERAPY: Daily Treatment Note 2020  Visit Count: 32     ICD-10: Treatment Diagnosis: Pain in left shoulder (M25.512); Presence of left artificial shoulder joint (B06.422)  Precautions/Allergies: Post op precautions. From EMR: Codeine   TREATMENT PLAN:  Effective Dates: 2020 TO 2020 (30 days). Frequency/Duration: 2 times a week for 30 additional Day(s) MEDICAL/REFERRING DIAGNOSIS:s/p L shoulder I&D, removal of [place] implant, revision reverse TSA, biceps tendonesis, lat dorsi and teres major tendon transfer  L shoulder   DATE OF ONSET: 20 surgery  REFERRING PHYSICIAN: Brandy Mcguire MD MD Orders: Eval and Treat; HEP; ROM; Strength; \"continue PT\" (20)  Return MD Appointment:        Katya Tellez was cleared by the screener to enter the clinic this morning. Pre-treatment Symptoms/Complaints: Says her shoulder is doing \"pretty good\". A little sore after last session. Pain: Initial:   no pain now. Post Session: no complaints of pain   Medications Last Reviewed: 6/15/20    Updated Objective Findings:   No new measure. TREATMENT:     Active Warm up on UBE x10' with alternating double arm and single arm each minute. Therapeutic Exercise: (50 Minutes): Exercises for shoulder and shoulder girdle strength and stabilization as per grid. Exercises modified as indicated. Added ball toss/catch for coordination with 4-in diameter ball and 2-in diameter ball using double hand and single-hand catch/throws with partner. Minimal cues for exercises technique and movement mechanics. HEP: No new HEP.    Date  20 Date  6/3/20 Date  6/10/20 Date  20 Date  6/15/20 Date  20 S/w pt, he would like an order to get checked for COVID antibodies. Activity/Exercise         UBE L 3 x10' L. 3 x10' L. 3 x10' L. 3 x10' L. 3 x10' L. 3 x10'   Pulleys - - - - - D/C   AROM - - - - - D/C   Serratus press - Standing dynamic hug  Blue 2x15 - Standing dynamic hug   Black   3x12 - Standing dynamic hug   Black   3x10-12   Shoulder Ext - Sitting reverse ext  7# cable  3x10 ea;  pull down, underhand  20# 3x15 Pull down, bilat underhand  17# x10  23# x10  27# x10 Reverse ext  10#  Cable 1x20 Pull down, bilat underhand  27# 3x10 -   Middle trapezius - Standing, bilat horiz abd   Red 3x10 - Bent over  3# 1x20 - Bent over   3# 1x20    Lower trapezius - Standing bilat \"Y\"  Red 2x10 - Bent over   3# 1x20 - Bent over   3# 1x20   Scapular retractions Landmine Row  17.5# bar  2x15 ea   Landmine Row  17.5# bar  2x15 ea Landmine Row  17.5# bar  3x15 ea Rope Pull  10# cable  2x10 rep Landmine Row  20# bar  3x12 ea Rope Pull  10# cable  3x2-3   Shoulder ER - - Standing,   by side single red  3 x10 - - -   Forward Elevation Landmine press  15# bar  3x10;  Standing overhead modified clean  15# bar 1x5,  Clean and press  15# bar  1x10 Landmine press  15# bar  3x10 ea Landmine press  17.5# bar  3x10;  Standing   Clean and press  15# bar   3x10 Wall slide+lift off  Red 3x10 Landmine press  20# bar  3x10  Wall slide+lift off  Red 3x10   Abduction Standing upright row   15# bar  1x10 - Standing upright row   15# bar  1x10 - - -   Diagonal Flexion D1  Single yellow  2x15;  D2   Single blue  2x15 - D1   Single red  3x10;  D2  Green  3x10 - D1:   Single yellow  3x10;  D2:  single red  3x10 -   Diagonal Extension D1  Single blue  2x15;  D2  Single yellow  2x15 - - - D1:  Single blue  X15, double blue 3x12;  D2:  Single yellow  1x15, double yellow  3x12 -   Internal rotation - - Standing,   by side single red  3 x10  - - -   Rhythmic Stabilization - - - Red flex bar scaption, slow  2x5 ea,  D2 to 90-deg flex x5 Wall dribble   500-g ball  12 to 6 o'clock  1x30 ea Wall dribble   500-g ball  12 to 6 o'clock  2x40-50 ea;  Red flex bar scaption, slow  3x5 ea   Closed kinetic chain--UE Tall plank  1x10\"; Plank on elbows   1x10\"; Side plank on knees 3x5\"; Push up plantigrade  3x5, U/LE oppositesin plantigrade 3x2 3 ea - - Crawl fwd/back 1x10-ft ea;  Bear walk fwd 3x10-ft; Ab roller  3x6 Tall plank to downward dog 2x5 Bear walk fwd 4x10-ft; Stability ball UE walk-out, fwd/back 2x4 steps; Ab roller  3x5   Horiz abd/add   Landmine H.abd/add  15# bar 2x10 - - Landmine H.abd/add  20# bar  3x10   Dead Lift    35# bar  3x15 -  35# bar  3x15   Reactive Control/Coordination      Small ball catch  Double hand, single hand x5'   -  Treatment/Session Summary:    · Response to Treatment: Very good performance of the exercises. Progressing loads and resistance with moves. Good start with hand/eye coordination and throws with small ball. · Communication/Consultation:  None today  · Equipment provided today:  None today  · Recommendations/Intent for next treatment session: We will continue with controlled shoulder girdle re-strengthening.      Total Treatment Billable Duration: 50 minutes  PT Patient Time In/Time Out  Time In: 7120  Time Out: 1225 Jerome Castorena PT    Future Appointments   Date Time Provider Asuncion Mccormick   6/29/2020  9:00 AM Nikky Dickerson, PT [facility]   7/1/2020  9:00 AM Kendal Belle, PT [facility]   7/6/2020  9:00 AM Kendal Belle, PT [facility]   7/8/2020  9:00 AM Kendal Belle, PT [facility]   7/15/2020  9:00 AM Kendal Belle, PT [facility]   7/20/2020  9:00 AM Kendal Belle, PT [facility]   7/22/2020  9:00 AM Kendal Belle, PT [facility]   7/27/2020  9:00 AM Kendal Belle, PT [facility]   7/29/2020  9:00 AM Kendal Odonnell, PT [facility]

## 2022-01-18 ENCOUNTER — OFFICE VISIT (OUTPATIENT)
Dept: INTERNAL MEDICINE | Age: 57
End: 2022-01-18
Payer: COMMERCIAL

## 2022-01-18 VITALS
WEIGHT: 224 LBS | DIASTOLIC BLOOD PRESSURE: 72 MMHG | OXYGEN SATURATION: 99 % | SYSTOLIC BLOOD PRESSURE: 140 MMHG | HEART RATE: 63 BPM | BODY MASS INDEX: 32.07 KG/M2 | HEIGHT: 70 IN

## 2022-01-18 DIAGNOSIS — I15.9 SECONDARY HYPERTENSION: ICD-10-CM

## 2022-01-18 DIAGNOSIS — D75.839 THROMBOCYTOSIS: Primary | ICD-10-CM

## 2022-01-18 DIAGNOSIS — E55.9 VITAMIN D DEFICIENCY: ICD-10-CM

## 2022-01-18 DIAGNOSIS — R73.9 HYPERGLYCEMIA: ICD-10-CM

## 2022-01-18 DIAGNOSIS — E78.2 MIXED HYPERLIPIDEMIA: ICD-10-CM

## 2022-01-18 PROCEDURE — 99214 OFFICE O/P EST MOD 30 MIN: CPT | Performed by: NURSE PRACTITIONER

## 2022-01-18 RX ORDER — ASCORBIC ACID 500 MG
500 TABLET ORAL DAILY
COMMUNITY

## 2022-01-18 RX ORDER — CETIRIZINE HYDROCHLORIDE 10 MG/1
CAPSULE, LIQUID FILLED ORAL
COMMUNITY

## 2022-01-18 RX ORDER — ZINC GLUCONATE 50 MG
50 TABLET ORAL DAILY
COMMUNITY

## 2022-01-18 ASSESSMENT — ENCOUNTER SYMPTOMS
COLOR CHANGE: 0
EYE DISCHARGE: 0
ABDOMINAL DISTENTION: 0
SORE THROAT: 0
WHEEZING: 0
ABDOMINAL PAIN: 0
VOMITING: 0
CONSTIPATION: 0
EYE ITCHING: 0
COUGH: 0
CHOKING: 0
NAUSEA: 0
BLOOD IN STOOL: 0
SHORTNESS OF BREATH: 0
STRIDOR: 0
TROUBLE SWALLOWING: 0
DIARRHEA: 0

## 2022-01-18 NOTE — ASSESSMENT & PLAN NOTE
We will recheck fasting he will watch his sugars try to cut back on carbs see him back in 3 months with labs

## 2022-01-18 NOTE — PROGRESS NOTES
[facility] PHYSICIAN SERVICES  [facility] INTERNAL MEDICINE  [address] [facility] [address]  [facility] [place] [address]  Dept: [phone]  Dept Fax: [phone] 33: [phone]    Jerel Rice (:  1965) is a 59 y.o. male,Established patient  with green, here for evaluation of the following chief complaint(s): 6 Month Follow-Up      Jerel Rice is a 59 y.o. male who presents today for his medical conditions/complaints as noted below. Jerel Rice is c/sharon 6 Month Follow-Up        HPI:     Chief Complaint   Patient presents with    6 Month Follow-Up     HPI   1. Hypertension he is on lisinopril 10 mg daily stable from  2. Hyperlipidemia he is on Zocor 20 at bedtime his cholesterol has gone up a little bit to 189 his triglycerides were 208 now they are at 364  3. Vitamin D deficiency  50,000 units weekly level today is 26  #4 hyperglycemia his blood fasting glucose was 125 his A1c is 6 which is up from 5.7  Past Medical History:   Diagnosis Date    Arthritis     Back pain     Elevated white blood cell count, unspecified     Gout     History of blood transfusion     Hypertension     Mixed hyperlipidemia 2021    Nicotine dependence, unspecified, uncomplicated       Past Surgical History:   Procedure Laterality Date    ABDOMEN SURGERY      APPENDECTOMY      CYST INCISION AND DRAINAGE Left     Hip-MRSA    DENTAL SURGERY      Total dental extraction    FOREIGN BODY REMOVAL N/A 2019    RECTAL FOREIGN BODY REMOVAL performed by Terri Herbert, DO at [address] [place] [address]    ?        Vitals 2022 2021 2020 2020 12/10/2019    SYSTOLIC 907 [phone]   DIASTOLIC 72 74 82 88 76 80   Pulse 63 82 72 69 96 87   Temp - - - - - 97.9   Resp - - - - 20 20   SpO2 99 97 96 98 98 93   Weight 224 lb 210 lb 209 lb 205 lb 14.4 oz 193 lb -   Height 5' 10\" 5' 10\" 5' 11\" ' \" \" -   Body mass index 32.14 kg/m2 30.13 kg/m2 29.15 kg/m2 28.71 kg/m2 26.92 kg/m2 -   Pain Level - - - - - -   Some recent data might be hidden       Family History   Problem Relation Age of Onset    Diabetes Father     Arthritis Father     Stroke Father     Colon Cancer Maternal Grandfather        Social History     Tobacco Use    Smoking status: Current Every Day Smoker     Packs/day: 1.00     Years: 41.00     Pack years: 41.00     Types: Cigarettes    Smokeless tobacco: Current User   Substance Use Topics    Alcohol use: No      Current Outpatient Medications   Medication Sig Dispense Refill    zinc gluconate 50 MG tablet Take 50 mg by mouth daily      vitamin C (ASCORBIC ACID) 500 MG tablet Take 500 mg by mouth daily      Cetirizine HCl (ZYRTEC ALLERGY) 10 MG CAPS Take by mouth      lisinopril (PRINIVIL;ZESTRIL) 10 MG tablet TAKE 1 TABLET BY MOUTH EVERY DAY 30 tablet 5    simvastatin (ZOCOR) 20 MG tablet TAKE 1 TABLET BY MOUTH EVERY DAY AT NIGHT 30 tablet 5    vitamin D (ERGOCALCIFEROL) 1.25 MG (37163 UT) CAPS capsule Take 1 capsule by mouth once a week 30 capsule 3    folic acid (FOLVITE) 1 MG tablet       methotrexate (RHEUMATREX) 2.5 MG chemo tablet        No current facility-administered medications for this visit. 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      Penicillins     Morphine Anxiety       Health Maintenance   Topic Date Due    Meningococcal (ACWY) vaccine (1 - Risk start 2-23 months series) Never done    Colon cancer screen colonoscopy  Never done    COVID-19 Vaccine (1) 07/18/2022 (Originally 1/18/1968)    Hib vaccine (1 of 1 - Risk 1-dose series) 07/20/2022 (Originally 4/18/1964)    DTaP/Tdap/Td vaccine (1 - Tdap) 07/20/2022 (Originally 1/18/1982)    Shingles Vaccine (1 of 2) 07/20/2022 (Originally 1/18/2013)    Meningococcal B vaccine (1 of 4 - Increased Risk Bexsero 2-dose series) 07/20/2022 (Originally 1/18/1973)    Flu vaccine (1) 01/18/2023 (Originally 9/1/2021)    Pneumococcal 0-64 years Vaccine (1 of 4 - PCV13) 08/06/2035 (Originally 1/18/1969)    Depression Screen  07/20/2022    A1C test (Diabetic or Prediabetic)  01/17/2023    Lipid screen  01/17/2023    Potassium monitoring  01/17/2023    Creatinine monitoring  01/17/2023    Hepatitis C screen  Completed    HIV screen  Completed    Hepatitis A vaccine  Aged Out    Hepatitis B vaccine  Aged Out    Low dose CT lung screening  Discontinued       Lab Results   Component Value Date    LABA1C 6.0 01/17/2022     Lab Results   Component Value Date    PSA 0.92 07/19/2021    PSA 1.57 05/14/2019     TSH   Date Value Ref Range Status   01/17/2022 1.680 0.270 - 4.200 uIU/mL Final   ]  Lab Results   Component Value Date     01/17/2022    K 4.8 01/17/2022     01/17/2022    CO2 28 01/17/2022    BUN 19 01/17/2022    CREATININE 0.9 01/17/2022    GLUCOSE 125 (H) 01/17/2022    CALCIUM 9.3 01/17/2022    PROT 7.1 01/17/2022    LABALBU 4.1 01/17/2022    BILITOT <0.2 01/17/2022    ALKPHOS 123 01/17/2022    AST 28 01/17/2022    ALT 53 (H) 01/17/2022    LABGLOM >60 01/17/2022    GFRAA >59 01/17/2022     Lab Results   Component Value Date    CHOL 189 01/17/2022    CHOL 151 (L) 07/19/2021    CHOL 232 (H) 05/14/2019     Lab Results   Component Value Date    TRIG 364 (H) 01/17/2022    TRIG 208 (H) 07/19/2021    TRIG 220 (H) 05/14/2019     Lab Results   Component Value Date    HDL 36 (L) 01/17/2022    HDL 43 (L) 07/19/2021    HDL 48 (L) 05/14/2019     Lab Results   Component Value Date    LDLCALC 80 01/17/2022    LDLCALC 66 07/19/2021    LDLCALC 140 05/14/2019     Lab Results   Component Value Date     01/17/2022     03/07/2011    K 4.8 01/17/2022    K 4.3 10/06/2019    K 4.5 03/07/2011     01/17/2022     03/07/2011    CO2 28 01/17/2022    BUN 19 01/17/2022    CREATININE 0.9 01/17/2022 CREATININE 0.8 03/07/2011    GLUCOSE 125 01/17/2022    CALCIUM 9.3 01/17/2022      Lab Results   Component Value Date    WBC 13.2 (H) 01/17/2022    HGB 15.9 01/17/2022    HCT 50.2 01/17/2022    MCV 99.8 (H) 01/17/2022     (H) 01/17/2022    LABLYMP 3.86 03/07/2011    LYMPHOPCT 24.8 01/17/2022    RBC 5.03 01/17/2022    MCH 31.6 (H) 01/17/2022    MCHC 31.7 (L) 01/17/2022    RDW 14.2 01/17/2022     Lab Results   Component Value Date    VITD25 26.0 (L) 01/17/2022     Labs reviewed from 1/17/2022    Subjective:      Review of Systems   Constitutional: Negative for fatigue, fever and unexpected weight change. HENT: Negative for ear discharge, ear pain, mouth sores, sore throat and trouble swallowing. Eyes: Negative for discharge, itching and visual disturbance. Respiratory: Negative for cough, choking, shortness of breath, wheezing and stridor. Cardiovascular: Negative for chest pain, palpitations and leg swelling. Gastrointestinal: Negative for abdominal distention, abdominal pain, blood in stool, constipation, diarrhea, nausea and vomiting. Endocrine: Negative for cold intolerance, polydipsia and polyuria. Genitourinary: Negative for difficulty urinating, dysuria, frequency and urgency. Musculoskeletal: Negative for arthralgias and gait problem. Skin: Negative for color change and rash. Allergic/Immunologic: Negative for food allergies and immunocompromised state. Neurological: Negative for dizziness, tremors, syncope, speech difficulty, weakness and headaches. Hematological: Negative for adenopathy. Does not bruise/bleed easily. Psychiatric/Behavioral: Negative for confusion and hallucinations. Objective:     Physical Exam  Constitutional:       General: He is not in acute distress. Appearance: He is well-developed. HENT:      Head: Normocephalic and atraumatic. Eyes:      General: No scleral icterus. Right eye: No discharge. Left eye: No discharge. Pupils: Pupils are equal, round, and reactive to light. Neck:      Thyroid: No thyromegaly. Vascular: No JVD. Cardiovascular:      Rate and Rhythm: Normal rate and regular rhythm. Heart sounds: Normal heart sounds. No murmur heard. Pulmonary:      Effort: Pulmonary effort is normal. No respiratory distress. Breath sounds: Normal breath sounds. No wheezing or rales. Abdominal:      General: Bowel sounds are normal. There is no distension. Palpations: Abdomen is soft. There is no mass. Tenderness: There is no abdominal tenderness. There is no guarding or rebound. Musculoskeletal:         General: No tenderness. Normal range of motion. Cervical back: Normal range of motion and neck supple. Skin:     General: Skin is warm and dry. Findings: No erythema or rash. Neurological:      Mental Status: He is alert and oriented to person, place, and time. Cranial Nerves: No cranial nerve deficit. Coordination: Coordination normal.      Deep Tendon Reflexes: Reflexes are normal and symmetric. Reflexes normal.   Psychiatric:         Mood and Affect: Mood is not depressed. Behavior: Behavior normal.         Thought Content:  Thought content normal.         Judgment: Judgment normal.       BP (!) 140/72   Pulse 63   Ht 5' 10\" (1.778 m)   Wt 224 lb (101.6 kg)   SpO2 99%   BMI 32.14 kg/m²           Assessment:      Problem List     Hyperglycemia     We will recheck fasting he will watch his sugars try to cut back on carbs see him back in 3 months with labs          Hypertension     He remains on lisinopril 10 mg daily stable for now          Mixed hyperlipidemia     Zocor 20 at bedtime cholesterol is up but triglycerides went up from 208 to 364         Relevant Medications    lisinopril (PRINIVIL;ZESTRIL) 10 MG tablet    simvastatin (ZOCOR) 20 MG tablet    Other Relevant Orders    Triglyceride    Thrombocytosis - Primary    Relevant Orders    Covid-19, Antibody, Total    Vitamin D deficiency        50,000 units weekly level today is 26               Plan:        Patient given educational materials - see patient instructions. Discussed use, benefit, and side effects of prescribed medications. Allpatient questions answered. Pt voiced understanding. Reviewed health maintenance. Instructed to continue current medications, diet and exercise. Patient agreed with treatment plan. Follow up as directed. MEDICATIONS:  No orders of the defined types were placed in this encounter. ORDERS:  Orders Placed This Encounter   Procedures    Covid-19, Antibody, Total    Triglyceride       Follow-up:  Return in about 3 months (around 4/18/2022) for have labs done prior to appt. PATIENT INSTRUCTIONS:  Patient Instructions   1. Hypertension The current medical regimen is effective;  continue present plan and medications. 2.  Hyperlipidemia  Watch your sweets and CARBS; Recheck in 3 months   3. Vit d d ef; The current medical regimen is effective;  continue present plan and medications. 4.  Hyperglycemia; Watching your carbs will ehlp      Electronically signed by DEMETRIS Smith on 1/18/2022 at 12:28 PM    @    Lizzie/transcription disclaimer:  Much of this encounter note is electronic transcription/translation of spoken language to printed texts. The electronic translation of spoken language may be erroneous, or at times,nonsensical words or phrases may be inadvertently transcribed.   Although I have reviewed the note for such errors, some may still exist.

## 2022-01-18 NOTE — PATIENT INSTRUCTIONS
1.    Hypertension The current medical regimen is effective;  continue present plan and medications. 2.  Hyperlipidemia  Watch your sweets and CARBS; Recheck in 3 months   3. Vit d d ef; The current medical regimen is effective;  continue present plan and medications. 4.  Hyperglycemia;   Watching your carbs will ehlp

## 2022-02-01 DIAGNOSIS — I15.9 SECONDARY HYPERTENSION: Primary | ICD-10-CM

## 2022-02-01 DIAGNOSIS — E78.2 MIXED HYPERLIPIDEMIA: ICD-10-CM

## 2022-02-02 RX ORDER — SIMVASTATIN 20 MG
TABLET ORAL
Qty: 30 TABLET | Refills: 5 | Status: SHIPPED | OUTPATIENT
Start: 2022-02-02 | End: 2022-08-08

## 2022-02-02 RX ORDER — LISINOPRIL 10 MG/1
TABLET ORAL
Qty: 30 TABLET | Refills: 5 | Status: SHIPPED | OUTPATIENT
Start: 2022-02-02 | End: 2022-07-20

## 2022-02-02 NOTE — TELEPHONE ENCOUNTER
Maycol Jernigan called to request a refill on his medication.       Last office visit : 1/18/2022   Next office visit : 4/18/2022     Requested Prescriptions     Pending Prescriptions Disp Refills    simvastatin (ZOCOR) 20 MG tablet [Pharmacy Med Name: SIMVASTATIN 20 MG TABLET] 30 tablet 5     Sig: TAKE 1 TABLET BY MOUTH EVERY DAY AT NIGHT    lisinopril (PRINIVIL;ZESTRIL) 10 MG tablet [Pharmacy Med Name: LISINOPRIL 10 MG TABLET] 30 tablet 5     Sig: TAKE 1 TABLET BY MOUTH EVERY DAY            Flora Howard MA

## 2022-02-09 RX ORDER — FOLIC ACID 1 MG/1
1 TABLET ORAL DAILY
Qty: 90 TABLET | Refills: 1 | Status: SHIPPED | OUTPATIENT
Start: 2022-02-09 | End: 2022-07-18 | Stop reason: SDUPTHER

## 2022-02-09 NOTE — TELEPHONE ENCOUNTER
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      Penicillins     Morphine Anxiety

## 2022-02-14 NOTE — TELEPHONE ENCOUNTER
Requested Prescriptions     Pending Prescriptions Disp Refills    methotrexate (RHEUMATREX) 2.5 MG chemo tablet 120 tablet 1     Sig: TAKE 10 TABLETS EVERY WEDNESDAY

## 2022-02-15 ENCOUNTER — NURSE TRIAGE (OUTPATIENT)
Dept: OTHER | Facility: CLINIC | Age: 57
End: 2022-02-15

## 2022-02-15 NOTE — TELEPHONE ENCOUNTER
Received call from [facility] [place] with [place] Flag Complaint. Subjective: Caller states \"left arm pain, feels like there is a knife digging at it\"     Current Symptoms: left arm pain, worse with movement, better at rest.     Onset: 6 months ago; worsening    Associated Symptoms: NA    Pain Severity: 6/10; sharp, throbbing; constant    Temperature: No     What has been tried: exercising-not helping anymore    LMP: NA Pregnant: NA    Recommended disposition: See PCP within 3 days    Care advice provided, patient verbalizes understanding; denies any other questions or concerns; instructed to call back for any new or worsening symptoms. Writer provided warm transfer to [facility] AND [facility] for appointment scheduling     Attention Provider: Thank you for allowing me to participate in the care of your patient. The patient was connected to triage in response to information provided to the [facility]. Please do not respond through this encounter as the response is not directed to a shared pool.         Reason for Disposition   [1] MODERATE pain (e.g., interferes with normal activities) AND [2] present > 3 days    Protocols used: ARM PAIN-ADULT-

## 2022-02-16 ENCOUNTER — OFFICE VISIT (OUTPATIENT)
Dept: INTERNAL MEDICINE | Age: 57
End: 2022-02-16
Payer: MEDICAID

## 2022-02-16 VITALS
DIASTOLIC BLOOD PRESSURE: 70 MMHG | OXYGEN SATURATION: 99 % | HEART RATE: 102 BPM | WEIGHT: 221 LBS | HEIGHT: 70 IN | BODY MASS INDEX: 31.64 KG/M2 | SYSTOLIC BLOOD PRESSURE: 138 MMHG

## 2022-02-16 DIAGNOSIS — M25.512 ACUTE PAIN OF LEFT SHOULDER: Primary | ICD-10-CM

## 2022-02-16 PROCEDURE — 99212 OFFICE O/P EST SF 10 MIN: CPT | Performed by: NURSE PRACTITIONER

## 2022-02-16 NOTE — PROGRESS NOTES
[facility] PHYSICIAN SERVICES  [facility] INTERNAL MEDICINE  [address] [facility] [address] [place] [address]  Dept: [phone]  Dept Fax: [phone] 33: [phone]    Kalpesh Pierce (:  1965) is a 59 y.o. male,Established patient  with green, here for evaluation of the following chief complaint(s): Arm Pain (left-times 5 months )      Kalpesh Pierce is a 59 y.o. male who presents today for his medical conditions/complaints as noted below. Kalpesh Pierce is c/sharon Arm Pain (left-times 5 months )        HPI:     Chief Complaint   Patient presents with    Arm Pain     left-times 5 months      HPI   1.left shoulder down to biceps pain for about 5 months; He has been using exercise with pulleys;,   He has also been using horse linament and nothing as helped       Past Medical History:   Diagnosis Date    Arthritis     Back pain     Elevated white blood cell count, unspecified     Gout     History of blood transfusion     Hypertension     Mixed hyperlipidemia 2021    Nicotine dependence, unspecified, uncomplicated       Past Surgical History:   Procedure Laterality Date    ABDOMEN SURGERY      APPENDECTOMY      CYST INCISION AND DRAINAGE Left     Hip-MRSA    DENTAL SURGERY      Total dental extraction    FOREIGN BODY REMOVAL N/A 2019    RECTAL FOREIGN BODY REMOVAL performed by Mya Glasgow,  at [address] [place] [address]    ?        Vitals 2022    SYSTOLIC 702 [phone]   DIASTOLIC 70 72 74 82 88 76   Pulse 102 63 82 72 69 96   Temp - - - - - -   Resp - - - - - 20   SpO2 99 99 97 96 98 98   Weight 221 lb 224 lb 210 lb 209 lb 205 lb 14.4 oz 193 lb   Height 5' 10\" 5' 10\" 5' 10\" 5' 11\" 5' 11\" 5' 11\"   Body mass index 31.71 kg/m2 32.14 kg/m2 30.13 kg/m2 29.15 kg/m2 28.71 kg/m2 26.92 kg/m2   Pain Level - - - - - -   Some recent data might be hidden       Family History   Problem Relation Age of Onset    Diabetes Father     Arthritis Father     Stroke Father     Colon Cancer Maternal Grandfather        Social History     Tobacco Use    Smoking status: Current Every Day Smoker     Packs/day: 1.00     Years: 41.00     Pack years: 41.00     Types: Cigarettes    Smokeless tobacco: Current User   Substance Use Topics    Alcohol use: No      Current Outpatient Medications   Medication Sig Dispense Refill    methotrexate (RHEUMATREX) 2.5 MG chemo tablet TAKE 10 TABLETS EVERY WEDNESDAY 400 tablet 1    folic acid (FOLVITE) 1 MG tablet Take 1 tablet by mouth daily 90 tablet 1    simvastatin (ZOCOR) 20 MG tablet TAKE 1 TABLET BY MOUTH EVERY DAY AT NIGHT 30 tablet 5    lisinopril (PRINIVIL;ZESTRIL) 10 MG tablet TAKE 1 TABLET BY MOUTH EVERY DAY 30 tablet 5    zinc gluconate 50 MG tablet Take 50 mg by mouth daily      vitamin C (ASCORBIC ACID) 500 MG tablet Take 500 mg by mouth daily      Cetirizine HCl (ZYRTEC ALLERGY) 10 MG CAPS Take by mouth      vitamin D (ERGOCALCIFEROL) 1.25 MG (89817 UT) CAPS capsule Take 1 capsule by mouth once a week 30 capsule 3     No current facility-administered medications for this visit. 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      Penicillins     Morphine Anxiety       Health Maintenance   Topic Date Due    Meningococcal (ACWY) vaccine (1 - Risk start 2-23 months series) Never done    Colorectal Cancer Screen  Never done    COVID-19 Vaccine (1) 07/18/2022 (Originally 1/18/1968)    Hib vaccine (1 of 1 - Risk 1-dose series) 07/20/2022 (Originally 4/18/1964)    DTaP/Tdap/Td vaccine (1 - Tdap) 07/20/2022 (Originally 1/18/1982)    Shingles Vaccine (1 of 2) 07/20/2022 (Originally 1/18/2013)    Meningococcal B vaccine (1 of 4 - Increased Risk Bexsero 2-dose series) 07/20/2022 (Originally 1/18/1973)    Flu vaccine (1) 01/18/2023 (Originally 9/1/2021)    Pneumococcal 0-64 years Vaccine (1 of 4 - PCV13) 08/06/2035 (Originally 1/18/1969)    Depression Screen  07/20/2022    A1C test (Diabetic or Prediabetic)  01/17/2023    Lipid screen  01/17/2023    Potassium monitoring  01/17/2023    Creatinine monitoring  01/17/2023    Hepatitis C screen  Completed    HIV screen  Completed    Hepatitis A vaccine  Aged Out    Hepatitis B vaccine  Aged Out    Low dose CT lung screening  Discontinued       Lab Results   Component Value Date    LABA1C 6.0 01/17/2022     Lab Results   Component Value Date    PSA 0.92 07/19/2021    PSA 1.57 05/14/2019     TSH   Date Value Ref Range Status   01/17/2022 1.680 0.270 - 4.200 uIU/mL Final   ]  Lab Results   Component Value Date     01/17/2022    K 4.8 01/17/2022     01/17/2022    CO2 28 01/17/2022    BUN 19 01/17/2022    CREATININE 0.9 01/17/2022    GLUCOSE 125 (H) 01/17/2022    CALCIUM 9.3 01/17/2022    PROT 7.1 01/17/2022    LABALBU 4.1 01/17/2022    BILITOT <0.2 01/17/2022    ALKPHOS 123 01/17/2022    AST 28 01/17/2022    ALT 53 (H) 01/17/2022    LABGLOM >60 01/17/2022    GFRAA >59 01/17/2022     Lab Results   Component Value Date    CHOL 189 01/17/2022    CHOL 151 (L) 07/19/2021    CHOL 232 (H) 05/14/2019     Lab Results   Component Value Date    TRIG 364 (H) 01/17/2022    TRIG 208 (H) 07/19/2021    TRIG 220 (H) 05/14/2019     Lab Results   Component Value Date    HDL 36 (L) 01/17/2022    HDL 43 (L) 07/19/2021    HDL 48 (L) 05/14/2019     Lab Results   Component Value Date    LDLCALC 80 01/17/2022    LDLCALC 66 07/19/2021    LDLCALC 140 05/14/2019     Lab Results   Component Value Date     01/17/2022     03/07/2011    K 4.8 01/17/2022    K 4.3 10/06/2019    K 4.5 03/07/2011     01/17/2022     03/07/2011    CO2 28 01/17/2022    BUN 19 01/17/2022    CREATININE 0.9 01/17/2022    CREATININE 0.8 03/07/2011    GLUCOSE 125 01/17/2022 CALCIUM 9.3 01/17/2022      Lab Results   Component Value Date    WBC 13.2 (H) 01/17/2022    HGB 15.9 01/17/2022    HCT 50.2 01/17/2022    MCV 99.8 (H) 01/17/2022     (H) 01/17/2022    LABLYMP 3.86 03/07/2011    LYMPHOPCT 24.8 01/17/2022    RBC 5.03 01/17/2022    MCH 31.6 (H) 01/17/2022    MCHC 31.7 (L) 01/17/2022    RDW 14.2 01/17/2022     Lab Results   Component Value Date    VITD25 26.0 (L) 01/17/2022       Subjective:      Review of Systems   Musculoskeletal: Positive for arthralgias. Objective:     Physical Exam  Musculoskeletal:      Comments: Painful left shoulder with ROM no deformity;         /70   Pulse 102   Ht 5' 10\" (1.778 m)   Wt 221 lb (100.2 kg)   SpO2 99%   BMI 31.71 kg/m²           Assessment:      Problem List     Acute pain of left shoulder - Primary     He would like to get referral to Dr corea           Relevant Orders    External Referral To Orthopedic Surgery          Plan:        Patient given educational materials - see patient instructions. Discussed use, benefit, and side effects of prescribed medications. Allpatient questions answered. Pt voiced understanding. Reviewed health maintenance. Instructed to continue current medications, diet and exercise. Patient agreed with treatment plan. Follow up as directed. MEDICATIONS:  No orders of the defined types were placed in this encounter. ORDERS:  Orders Placed This Encounter   Procedures    External Referral To Orthopedic Surgery       Follow-up:  No follow-ups on file. PATIENT INSTRUCTIONS:  Patient Instructions   1.   Painful left shoulder he would like referral to Dr. Chico Rodriguez    Electronically signed by DEMETRIS Lal on 2/16/2022 at 12:15 PM    @On this date 2/16/2022 I have spent 15 minutes reviewing previous notes, test results and face to face with the patient discussing the diagnosis and importance of compliance with the treatment plan as well as documenting on the day of the visit.    EMRDragon/transcription disclaimer:  Much of this encounter note is electronic transcription/translation of spoken language to printed texts. The electronic translation of spoken language may be erroneous, or at times,nonsensical words or phrases may be inadvertently transcribed.   Although I have reviewed the note for such errors, some may still exist.

## 2022-02-21 RX ORDER — HYDROCHLOROTHIAZIDE 25 MG/1
25 TABLET ORAL EVERY MORNING
Qty: 90 TABLET | Refills: 1 | Status: SHIPPED | OUTPATIENT
Start: 2022-02-21 | End: 2022-06-20

## 2022-04-13 DIAGNOSIS — R73.9 HYPERGLYCEMIA: ICD-10-CM

## 2022-04-13 DIAGNOSIS — I15.9 SECONDARY HYPERTENSION: Primary | ICD-10-CM

## 2022-04-13 DIAGNOSIS — E55.9 VITAMIN D DEFICIENCY: ICD-10-CM

## 2022-04-13 DIAGNOSIS — E78.2 MIXED HYPERLIPIDEMIA: ICD-10-CM

## 2022-04-15 DIAGNOSIS — I15.9 SECONDARY HYPERTENSION: ICD-10-CM

## 2022-04-15 DIAGNOSIS — E78.2 MIXED HYPERLIPIDEMIA: ICD-10-CM

## 2022-04-15 DIAGNOSIS — R73.9 HYPERGLYCEMIA: ICD-10-CM

## 2022-04-15 DIAGNOSIS — E55.9 VITAMIN D DEFICIENCY: ICD-10-CM

## 2022-04-15 LAB
ALBUMIN SERPL-MCNC: 4.4 G/DL (ref 3.5–5.2)
ALP BLD-CCNC: 142 U/L (ref 40–130)
ALT SERPL-CCNC: 79 U/L (ref 5–41)
ANION GAP SERPL CALCULATED.3IONS-SCNC: 11 MMOL/L (ref 7–19)
AST SERPL-CCNC: 42 U/L (ref 5–40)
BASOPHILS ABSOLUTE: 0.1 K/UL (ref 0–0.2)
BASOPHILS RELATIVE PERCENT: 0.8 % (ref 0–1)
BILIRUB SERPL-MCNC: <0.2 MG/DL (ref 0.2–1.2)
BUN BLDV-MCNC: 22 MG/DL (ref 6–20)
CALCIUM SERPL-MCNC: 9.3 MG/DL (ref 8.6–10)
CHLORIDE BLD-SCNC: 99 MMOL/L (ref 98–111)
CO2: 30 MMOL/L (ref 22–29)
CREAT SERPL-MCNC: 0.8 MG/DL (ref 0.5–1.2)
EOSINOPHILS ABSOLUTE: 0.3 K/UL (ref 0–0.6)
EOSINOPHILS RELATIVE PERCENT: 2.3 % (ref 0–5)
GFR AFRICAN AMERICAN: >59
GFR NON-AFRICAN AMERICAN: >60
GLUCOSE BLD-MCNC: 112 MG/DL (ref 74–109)
HBA1C MFR BLD: 6 % (ref 4–6)
HCT VFR BLD CALC: 50.2 % (ref 42–52)
HEMOGLOBIN: 16 G/DL (ref 14–18)
IMMATURE GRANULOCYTES #: 0.1 K/UL
LYMPHOCYTES ABSOLUTE: 3.8 K/UL (ref 1.1–4.5)
LYMPHOCYTES RELATIVE PERCENT: 26.1 % (ref 20–40)
MCH RBC QN AUTO: 31.6 PG (ref 27–31)
MCHC RBC AUTO-ENTMCNC: 31.9 G/DL (ref 33–37)
MCV RBC AUTO: 99 FL (ref 80–94)
MONOCYTES ABSOLUTE: 1.4 K/UL (ref 0–0.9)
MONOCYTES RELATIVE PERCENT: 9.5 % (ref 0–10)
NEUTROPHILS ABSOLUTE: 8.9 K/UL (ref 1.5–7.5)
NEUTROPHILS RELATIVE PERCENT: 60.8 % (ref 50–65)
PDW BLD-RTO: 14.6 % (ref 11.5–14.5)
PLATELET # BLD: 472 K/UL (ref 130–400)
PMV BLD AUTO: 8.7 FL (ref 9.4–12.4)
POTASSIUM SERPL-SCNC: 4.5 MMOL/L (ref 3.5–5)
RBC # BLD: 5.07 M/UL (ref 4.7–6.1)
SODIUM BLD-SCNC: 140 MMOL/L (ref 136–145)
TOTAL PROTEIN: 6.8 G/DL (ref 6.6–8.7)
VITAMIN D 25-HYDROXY: 30.5 NG/ML
WBC # BLD: 14.6 K/UL (ref 4.8–10.8)

## 2022-04-18 ENCOUNTER — OFFICE VISIT (OUTPATIENT)
Dept: INTERNAL MEDICINE | Age: 57
End: 2022-04-18
Payer: MEDICAID

## 2022-04-18 VITALS
OXYGEN SATURATION: 96 % | BODY MASS INDEX: 30.8 KG/M2 | DIASTOLIC BLOOD PRESSURE: 60 MMHG | HEART RATE: 100 BPM | HEIGHT: 71 IN | SYSTOLIC BLOOD PRESSURE: 138 MMHG | WEIGHT: 220 LBS

## 2022-04-18 DIAGNOSIS — Z12.5 ENCOUNTER FOR PROSTATE CANCER SCREENING: Primary | ICD-10-CM

## 2022-04-18 DIAGNOSIS — T63.301A SPIDER BITE WOUND, ACCIDENTAL OR UNINTENTIONAL, INITIAL ENCOUNTER: ICD-10-CM

## 2022-04-18 DIAGNOSIS — I15.9 SECONDARY HYPERTENSION: ICD-10-CM

## 2022-04-18 DIAGNOSIS — E78.2 MIXED HYPERLIPIDEMIA: ICD-10-CM

## 2022-04-18 DIAGNOSIS — E55.9 VITAMIN D DEFICIENCY: ICD-10-CM

## 2022-04-18 DIAGNOSIS — M05.79 RHEUMATOID ARTHRITIS INVOLVING MULTIPLE SITES WITH POSITIVE RHEUMATOID FACTOR (HCC): ICD-10-CM

## 2022-04-18 PROCEDURE — 99214 OFFICE O/P EST MOD 30 MIN: CPT | Performed by: NURSE PRACTITIONER

## 2022-04-18 RX ORDER — MAGNESIUM GLUCONATE 27 MG(500)
500 TABLET ORAL EVERY OTHER DAY
COMMUNITY
End: 2022-10-04

## 2022-04-18 RX ORDER — BENZONATATE 200 MG/1
200 CAPSULE ORAL 2 TIMES DAILY PRN
Qty: 60 CAPSULE | Refills: 1 | Status: SHIPPED | OUTPATIENT
Start: 2022-04-18 | End: 2022-07-20

## 2022-04-18 RX ORDER — CEFDINIR 300 MG/1
300 CAPSULE ORAL 2 TIMES DAILY
Qty: 14 CAPSULE | Refills: 0 | Status: SHIPPED | OUTPATIENT
Start: 2022-04-18 | End: 2022-04-25

## 2022-04-18 ASSESSMENT — ENCOUNTER SYMPTOMS
DIARRHEA: 0
TROUBLE SWALLOWING: 0
BLOOD IN STOOL: 0
EYE ITCHING: 0
SHORTNESS OF BREATH: 0
CHOKING: 0
CONSTIPATION: 0
ABDOMINAL DISTENTION: 0
STRIDOR: 0
WHEEZING: 0
VOMITING: 0
BACK PAIN: 1
COUGH: 0
ABDOMINAL PAIN: 0
EYE DISCHARGE: 0
NAUSEA: 0
SORE THROAT: 0
COLOR CHANGE: 0

## 2022-04-18 NOTE — PATIENT INSTRUCTIONS
1.  Hypertension  The current medical regimen is effective;  continue present plan and medications. 2.  Hyperlipidemia; The current medical regimen is effective;  continue present plan and medications. 3.  Vit d def; The current medical regimen is effective;  continue present plan and medications. 4.  RA:  The current medical regimen is effective;  continue present plan and medications. #5 likely spider bite to right thigh inner aspect.   We will start cefdinir 300 twice a day for 7 days warm moist compresses and recheck on Wednesday or Thursday if not any better

## 2022-04-18 NOTE — ASSESSMENT & PLAN NOTE
He is followed by rheumatologist Eliot Harris he will be transferring to Dr. Loren Mccarty with the help of that physician he is on methotrexate and has helped some

## 2022-04-18 NOTE — ASSESSMENT & PLAN NOTE
He appears to have a spider bite on the right inner aspect of thigh.   Warm moist compresses 3-4 times daily start cefdinir 300 twice a day for 7 days he does tolerate cephalosporins he has been on Keflex many times in the past despite his penicillin allergy

## 2022-04-18 NOTE — PROGRESS NOTES
[facility] PHYSICIAN SERVICES  [facility] INTERNAL MEDICINE  [address] [address]  [address] [address]  Dept: [phone]  Dept Fax: [phone]: [phone]    Braeden Humphreys (:  1965) is a 59 y.o. male,Established patient  with  here for evaluation of the following chief complaint(s): 3 Month Follow-Up      Braeden Humphreys is a 59 y.o. male who presents today for his medical conditions/complaints as noted below. Braeden Humphreys is c/sharon 3 Month Follow-Up        HPI:     Chief Complaint   Patient presents with    3 Month Follow-Up     HPI   1. Hypertension he is on HCTZ 25 lisinopril 10 stable no changes  2. Hyperlipidemia; he is on Zocor 20 at bedtime level is good no changes are necessary  3. Vit da his vitamin D is okay he is on 50,000 weekly no side effects and takes as directed  4. RA: He has been going to rheumatologist in [facility] but as he switched to Medicaid they will no longer take his insurance so that physician he is going to try to get him transferred up to Dr. Jeronimo Su   #5 possible spider bite to right thigh. He said is been there for 2 weeks he has been digging it with a knife so is hard to tell if the core is black from a bite or black from him digging it I think if it was from the bite itself for 2 weeks it would be a lot larger.   There is inflamed material around the site he definitely needs to be on antibiotics      Past Medical History:   Diagnosis Date    Arthritis     Back pain     Elevated white blood cell count, unspecified     Gout     History of blood transfusion     Hypertension     Mixed hyperlipidemia 2021    Nicotine dependence, unspecified, uncomplicated       Past Surgical History:   Procedure Laterality Date    ABDOMEN SURGERY      APPENDECTOMY      CYST INCISION AND DRAINAGE Left     Hip-MRSA    DENTAL SURGERY      Total dental extraction    FOREIGN BODY REMOVAL N/A 2019    RECTAL FOREIGN BODY REMOVAL performed by Manjnider Gan Deepthi, DO at [facility] 18 N/A 1981    ?        Vitals 4/18/2022 2/16/2022 1/18/2022 7/20/2021 2/17/2020 4/68/9839   SYSTOLIC 029 204 336 934 293 358   DIASTOLIC 60 70 72 74 82 88   Pulse 100 102 63 82 72 69   Temp - - - - - -   Resp - - - - - -   SpO2 96 99 99 97 96 98   Weight 220 lb 221 lb 224 lb 210 lb 209 lb 205 lb 14.4 oz   Height 5' 11\" 5' 10\" 5' 10\" 5' 10\" 5' 11\" 5' 11\"   Body mass index 30.68 kg/m2 31.71 kg/m2 32.14 kg/m2 30.13 kg/m2 29.15 kg/m2 28.71 kg/m2   Pain Level - - - - - -   Some recent data might be hidden       Family History   Problem Relation Age of Onset    Diabetes Father     Arthritis Father     Stroke Father     Colon Cancer Maternal Grandfather        Social History     Tobacco Use    Smoking status: Current Every Day Smoker     Packs/day: 1.00     Years: 41.00     Pack years: 41.00     Types: Cigarettes    Smokeless tobacco: Current User   Substance Use Topics    Alcohol use: No      Current Outpatient Medications   Medication Sig Dispense Refill    magnesium gluconate (MAGONATE) 500 MG tablet Take 500 mg by mouth every other day      Potassium 75 MG TABS Take by mouth every other day      benzonatate (TESSALON) 200 MG capsule Take 1 capsule by mouth 2 times daily as needed for Cough 60 capsule 1    cefdinir (OMNICEF) 300 MG capsule Take 1 capsule by mouth 2 times daily for 7 days 14 capsule 0    hydroCHLOROthiazide (HYDRODIURIL) 25 MG tablet Take 1 tablet by mouth every morning 90 tablet 1    methotrexate (RHEUMATREX) 2.5 MG chemo tablet TAKE 10 TABLETS EVERY WEDNESDAY 428 tablet 1    folic acid (FOLVITE) 1 MG tablet Take 1 tablet by mouth daily 90 tablet 1    simvastatin (ZOCOR) 20 MG tablet TAKE 1 TABLET BY MOUTH EVERY DAY AT NIGHT 30 tablet 5    lisinopril (PRINIVIL;ZESTRIL) 10 MG tablet TAKE 1 TABLET BY MOUTH EVERY DAY 30 tablet 5    zinc gluconate 50 MG tablet Take 50 mg by mouth daily      vitamin C (ASCORBIC ACID) 500 MG tablet Take 500 mg by mouth daily      Cetirizine HCl (ZYRTEC ALLERGY) 10 MG CAPS Take by mouth      vitamin D (ERGOCALCIFEROL) 1.25 MG (81052 UT) CAPS capsule Take 1 capsule by mouth once a week 30 capsule 3     No current facility-administered medications for this visit. 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      Penicillins     Morphine Anxiety       Health Maintenance   Topic Date Due    Colorectal Cancer Screen  Never done    Meningococcal (ACWY) vaccine (1 - Risk start 2-23 months series) 04/18/2022 (Originally 1965)    COVID-19 Vaccine (1) 07/18/2022 (Originally 1/18/1968)    Hib vaccine (1 of 1 - Risk 1-dose series) 07/20/2022 (Originally 4/18/1964)    DTaP/Tdap/Td vaccine (1 - Tdap) 07/20/2022 (Originally 1/18/1982)    Shingles Vaccine (1 of 2) 07/20/2022 (Originally 1/18/2013)    Meningococcal B vaccine (1 of 4 - Increased Risk Bexsero 2-dose series) 07/20/2022 (Originally 1/18/1973)    Flu vaccine (Season Ended) 01/18/2023 (Originally 9/1/2022)    Pneumococcal 0-64 years Vaccine (1 - PCV) 08/06/2035 (Originally 1/18/1969)    Depression Screen  07/20/2022    Lipid screen  01/17/2023    A1C test (Diabetic or Prediabetic)  04/15/2023    Potassium monitoring  04/15/2023    Creatinine monitoring  04/15/2023    Hepatitis C screen  Completed    HIV screen  Completed    Hepatitis A vaccine  Aged Out    Hepatitis B vaccine  Aged Out    Low dose CT lung screening  Discontinued       Lab Results   Component Value Date    LABA1C 6.0 04/15/2022     Lab Results   Component Value Date    PSA 0.92 07/19/2021    PSA 1.57 05/14/2019     TSH   Date Value Ref Range Status   01/17/2022 1.680 0.270 - 4.200 uIU/mL Final   ]  Lab Results   Component Value Date     04/15/2022    K 4.5 04/15/2022    CL 99 04/15/2022    CO2 30 (H) 04/15/2022    BUN 22 (H) 04/15/2022    CREATININE 0.8 04/15/2022    GLUCOSE 112 (H) 04/15/2022    CALCIUM 9.3 04/15/2022    PROT 6.8 04/15/2022    LABALBU 4.4 04/15/2022    BILITOT <0.2 04/15/2022    ALKPHOS 142 (H) 04/15/2022    AST 42 (H) 04/15/2022    ALT 79 (H) 04/15/2022    LABGLOM >60 04/15/2022    GFRAA >59 04/15/2022     Lab Results   Component Value Date    CHOL 189 01/17/2022    CHOL 151 (L) 07/19/2021    CHOL 232 (H) 05/14/2019     Lab Results   Component Value Date    TRIG 364 (H) 01/17/2022    TRIG 208 (H) 07/19/2021    TRIG 220 (H) 05/14/2019     Lab Results   Component Value Date    HDL 36 (L) 01/17/2022    HDL 43 (L) 07/19/2021    HDL 48 (L) 05/14/2019     Lab Results   Component Value Date    LDLCALC 80 01/17/2022    LDLCALC 66 07/19/2021    LDLCALC 140 05/14/2019     Lab Results   Component Value Date     04/15/2022     03/07/2011    K 4.5 04/15/2022    K 4.3 10/06/2019    K 4.5 03/07/2011    CL 99 04/15/2022     03/07/2011    CO2 30 04/15/2022    BUN 22 04/15/2022    CREATININE 0.8 04/15/2022    CREATININE 0.8 03/07/2011    GLUCOSE 112 04/15/2022    CALCIUM 9.3 04/15/2022      Lab Results   Component Value Date    WBC 14.6 (H) 04/15/2022    HGB 16.0 04/15/2022    HCT 50.2 04/15/2022    MCV 99.0 (H) 04/15/2022     (H) 04/15/2022    LABLYMP 3.86 03/07/2011    LYMPHOPCT 26.1 04/15/2022    RBC 5.07 04/15/2022    MCH 31.6 (H) 04/15/2022    MCHC 31.9 (L) 04/15/2022    RDW 14.6 (H) 04/15/2022     Lab Results   Component Value Date    VITD25 30.5 04/15/2022     Labs reviewed from 4/15/2022    Subjective:      Review of Systems   Constitutional: Negative for fatigue, fever and unexpected weight change. HENT: Negative for ear discharge, ear pain, mouth sores, sore throat and trouble swallowing. Eyes: Negative for discharge, itching and visual disturbance. Respiratory: Negative for cough, choking, shortness of breath, wheezing and stridor.     Cardiovascular: Negative for chest pain, palpitations and leg swelling. Gastrointestinal: Negative for abdominal distention, abdominal pain, blood in stool, constipation, diarrhea, nausea and vomiting. Endocrine: Negative for cold intolerance, polydipsia and polyuria. Genitourinary: Negative for difficulty urinating, dysuria, frequency and urgency. Musculoskeletal: Positive for arthralgias and back pain. Negative for gait problem. Skin: Positive for wound. Negative for color change and rash. Allergic/Immunologic: Negative for food allergies and immunocompromised state. Neurological: Negative for dizziness, tremors, syncope, speech difficulty, weakness and headaches. Hematological: Negative for adenopathy. Does not bruise/bleed easily. Psychiatric/Behavioral: Negative for confusion and hallucinations. Objective:     Physical Exam  Constitutional:       General: He is not in acute distress. Appearance: He is well-developed. HENT:      Head: Normocephalic and atraumatic. Eyes:      General: No scleral icterus. Right eye: No discharge. Left eye: No discharge. Pupils: Pupils are equal, round, and reactive to light. Neck:      Thyroid: No thyromegaly. Vascular: No JVD. Cardiovascular:      Rate and Rhythm: Normal rate and regular rhythm. Heart sounds: Normal heart sounds. No murmur heard. Pulmonary:      Effort: Pulmonary effort is normal. No respiratory distress. Breath sounds: Normal breath sounds. No wheezing or rales. Abdominal:      General: Bowel sounds are normal. There is no distension. Palpations: Abdomen is soft. There is no mass. Tenderness: There is no abdominal tenderness. There is no guarding or rebound. Musculoskeletal:         General: No tenderness. Normal range of motion. Cervical back: Normal range of motion and neck supple. Skin:     General: Skin is warm and dry. Findings: No erythema or rash.              Comments: Likely spider bite inner aspect of right thigh with surrounding induration. Neurological:      Mental Status: He is alert and oriented to person, place, and time. Cranial Nerves: No cranial nerve deficit. Coordination: Coordination normal.      Deep Tendon Reflexes: Reflexes are normal and symmetric. Reflexes normal.   Psychiatric:         Mood and Affect: Mood is not depressed. Behavior: Behavior normal.         Thought Content: Thought content normal.         Judgment: Judgment normal.       /60   Pulse 100   Ht 5' 11\" (1.803 m)   Wt 220 lb (99.8 kg)   SpO2 96%   BMI 30.68 kg/m²           Assessment:      Problem List     Hypertension     He is stable on current dose of lisinopril 10 HCTZ 25          Relevant Medications    lisinopril (PRINIVIL;ZESTRIL) 10 MG tablet    Mixed hyperlipidemia     Stable on Zocor 10 labs reviewed no changes          Relevant Medications    simvastatin (ZOCOR) 20 MG tablet    lisinopril (PRINIVIL;ZESTRIL) 10 MG tablet    hydroCHLOROthiazide (HYDRODIURIL) 25 MG tablet    Rheumatoid arthritis involving multiple sites with positive rheumatoid factor ([facility] [address]     He is followed by rheumatologist Manjinder Speak he will be transferring to Dr. Shad Morgan with the help of that physician he is on methotrexate and has helped some          Relevant Medications    methotrexate (RHEUMATREX) 2.5 MG chemo tablet    Spider bite     He appears to have a spider bite on the right inner aspect of thigh. Warm moist compresses 3-4 times daily start cefdinir 300 twice a day for 7 days he does tolerate cephalosporins he has been on Keflex many times in the past despite his penicillin allergy          Vitamin D deficiency     He is stable on current dose of meds continue same                Plan:        Patient given educational materials - see patient instructions. Discussed use, benefit, and side effects of prescribed medications. Allpatient questions answered. Pt voiced understanding.  Reviewed health maintenance. Instructed to continue current medications, diet and exercise. Patient agreed with treatment plan. Follow up as directed. MEDICATIONS:  Orders Placed This Encounter   Medications    benzonatate (TESSALON) 200 MG capsule     Sig: Take 1 capsule by mouth 2 times daily as needed for Cough     Dispense:  60 capsule     Refill:  1    cefdinir (OMNICEF) 300 MG capsule     Sig: Take 1 capsule by mouth 2 times daily for 7 days     Dispense:  14 capsule     Refill:  0         ORDERS:  No orders of the defined types were placed in this encounter. Follow-up:  Return in about 3 months (around 7/18/2022). PATIENT INSTRUCTIONS:  Patient Instructions   1. Hypertension  The current medical regimen is effective;  continue present plan and medications. 2.  Hyperlipidemia; The current medical regimen is effective;  continue present plan and medications. 3.  Vit d def; The current medical regimen is effective;  continue present plan and medications. 4.  RA:  The current medical regimen is effective;  continue present plan and medications. #5 likely spider bite to right thigh inner aspect. We will start cefdinir 300 twice a day for 7 days warm moist compresses and recheck on Wednesday or Thursday if not any better    Electronically signed by DEMETRIS Schafer on 4/18/2022 at 1:05 PM        EMRDragon/transcription disclaimer:  Much of this encounter note is electronic transcription/translation of spoken language to printed texts. The electronic translation of spoken language may be erroneous, or at times,nonsensical words or phrases may be inadvertently transcribed.   Although I have reviewed the note for such errors, some may still exist.

## 2022-05-09 ENCOUNTER — OFFICE VISIT (OUTPATIENT)
Dept: INTERNAL MEDICINE | Age: 57
End: 2022-05-09
Payer: MEDICAID

## 2022-05-09 VITALS — SYSTOLIC BLOOD PRESSURE: 148 MMHG | DIASTOLIC BLOOD PRESSURE: 70 MMHG | OXYGEN SATURATION: 95 % | HEART RATE: 94 BPM

## 2022-05-09 DIAGNOSIS — T63.301S SPIDER BITE WOUND, ACCIDENTAL OR UNINTENTIONAL, SEQUELA: ICD-10-CM

## 2022-05-09 PROCEDURE — 99213 OFFICE O/P EST LOW 20 MIN: CPT | Performed by: NURSE PRACTITIONER

## 2022-05-09 ASSESSMENT — ENCOUNTER SYMPTOMS
EYE ITCHING: 0
CHOKING: 0
STRIDOR: 0
WHEEZING: 0
SORE THROAT: 0
SHORTNESS OF BREATH: 0
EYE DISCHARGE: 0
ABDOMINAL DISTENTION: 0
BLOOD IN STOOL: 0
TROUBLE SWALLOWING: 0
NAUSEA: 0
ABDOMINAL PAIN: 0
COUGH: 0
VOMITING: 0
DIARRHEA: 0
COLOR CHANGE: 0
CONSTIPATION: 0

## 2022-05-09 NOTE — PATIENT INSTRUCTIONS
Spider bite left thigh thought this is getting better just continue to do warm soaks 2 to get worse area get bigger hard  Me know and we will get you an appointment with one of the surgeons for I&D

## 2022-05-09 NOTE — PROGRESS NOTES
[facility] PHYSICIAN SERVICES  [facility] INTERNAL MEDICINE  [address] [address]  [address] [address]  Dept: [phone]  Dept Fax: [phone]: [phone]    Ochoa Hui (:  1965) is a 59 y.o. male,Established patient  with green, here for evaluation of the following chief complaint(s): Other (spider bite rt lower leg)      Ochoa Hui is a 59 y.o. male who presents today for his medical conditions/complaints as noted below. Ochoa Comment is c/sharon Other (spider bite rt lower leg)        HPI:     Chief Complaint   Patient presents with    Other     spider bite rt lower leg     HPI   1. Back to the back of his right leg    Past Medical History:   Diagnosis Date    Arthritis     Back pain     Elevated white blood cell count, unspecified     Gout     History of blood transfusion     Hypertension     Mixed hyperlipidemia 2021    Nicotine dependence, unspecified, uncomplicated       Past Surgical History:   Procedure Laterality Date    ABDOMEN SURGERY      APPENDECTOMY      CYST INCISION AND DRAINAGE Left     Hip-MRSA    DENTAL SURGERY      Total dental extraction    FOREIGN BODY REMOVAL N/A 2019    RECTAL FOREIGN BODY REMOVAL performed by Yobani Pina DO at [address] [place] [address] Road    ?        Vitals [id number]   SYSTOLIC 888 [phone]   DIASTOLIC 70 70 60 70 72 74   Pulse - 94 100 102 63 82   Temp - - - - - -   Resp - - - - - -   SpO2 - 95 96 99 99 97   Weight - - 220 lb 221 lb 224 lb 210 lb   Height - - 5' 11\" 5' 10\" 5' 10\" 5' 10\"   Body mass index - - 30.68 kg/m2 31.71 kg/m2 32.14 kg/m2 30.13 kg/m2   Pain Level - - - - - -   Some recent data might be hidden       Family History   Problem Relation Age of Onset    Diabetes Father     Arthritis Father     Stroke Father     Colon Cancer Maternal Grandfather        Social History     Tobacco Use    Smoking status: Current Every Day Smoker     Packs/day: 1.00     Years: 41.00     Pack years: 41.00     Types: Cigarettes    Smokeless tobacco: Current User   Substance Use Topics    Alcohol use: No      Current Outpatient Medications   Medication Sig Dispense Refill    magnesium gluconate (MAGONATE) 500 MG tablet Take 500 mg by mouth every other day      Potassium 75 MG TABS Take by mouth every other day      benzonatate (TESSALON) 200 MG capsule Take 1 capsule by mouth 2 times daily as needed for Cough 60 capsule 1    hydroCHLOROthiazide (HYDRODIURIL) 25 MG tablet Take 1 tablet by mouth every morning 90 tablet 1    methotrexate (RHEUMATREX) 2.5 MG chemo tablet TAKE 10 TABLETS EVERY WEDNESDAY 442 tablet 1    folic acid (FOLVITE) 1 MG tablet Take 1 tablet by mouth daily 90 tablet 1    simvastatin (ZOCOR) 20 MG tablet TAKE 1 TABLET BY MOUTH EVERY DAY AT NIGHT 30 tablet 5    lisinopril (PRINIVIL;ZESTRIL) 10 MG tablet TAKE 1 TABLET BY MOUTH EVERY DAY 30 tablet 5    zinc gluconate 50 MG tablet Take 50 mg by mouth daily      vitamin C (ASCORBIC ACID) 500 MG tablet Take 500 mg by mouth daily      Cetirizine HCl (ZYRTEC ALLERGY) 10 MG CAPS Take by mouth      vitamin D (ERGOCALCIFEROL) 1.25 MG (01783 UT) CAPS capsule Take 1 capsule by mouth once a week 30 capsule 3     No current facility-administered medications for this visit. 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      Penicillins     Morphine Anxiety       Health Maintenance   Topic Date Due    Meningococcal (ACWY) vaccine (1 - Risk start 2-23 months series) Never done    Colorectal Cancer Screen  Never done    COVID-19 Vaccine (1) 07/18/2022 (Originally 1/18/1968)    Hib vaccine (1 of 1 - Risk 1-dose series) 07/20/2022 (Originally 4/18/1964)    DTaP/Tdap/Td vaccine (1 - Tdap) 07/20/2022 (Originally 1/18/1982)    Shingles vaccine (1 of 2) 07/20/2022 (Originally 1/18/2013)    Meningococcal B vaccine (1 of 4 - Increased Risk Bexsero 2-dose series) 07/20/2022 (Originally 1/18/1973)    Flu vaccine (Season Ended) 01/18/2023 (Originally 9/1/2022)    Pneumococcal 0-64 years Vaccine (1 - PCV) 08/06/2035 (Originally 1/18/1969)    Depression Screen  07/20/2022    Lipids  01/17/2023    A1C test (Diabetic or Prediabetic)  04/15/2023    Potassium  04/15/2023    Creatinine  04/15/2023    Hepatitis C screen  Completed    HIV screen  Completed    Hepatitis A vaccine  Aged Out    Hepatitis B vaccine  Aged Out    Low dose CT lung screening  Discontinued       Lab Results   Component Value Date    LABA1C 6.0 04/15/2022     Lab Results   Component Value Date    PSA 0.92 07/19/2021    PSA 1.57 05/14/2019     TSH   Date Value Ref Range Status   01/17/2022 1.680 0.270 - 4.200 uIU/mL Final   ]  Lab Results   Component Value Date     04/15/2022    K 4.5 04/15/2022    CL 99 04/15/2022    CO2 30 (H) 04/15/2022    BUN 22 (H) 04/15/2022    CREATININE 0.8 04/15/2022    GLUCOSE 112 (H) 04/15/2022    CALCIUM 9.3 04/15/2022    PROT 6.8 04/15/2022    LABALBU 4.4 04/15/2022    BILITOT <0.2 04/15/2022    ALKPHOS 142 (H) 04/15/2022    AST 42 (H) 04/15/2022    ALT 79 (H) 04/15/2022    LABGLOM >60 04/15/2022    GFRAA >59 04/15/2022     Lab Results   Component Value Date    CHOL 189 01/17/2022    CHOL 151 (L) 07/19/2021    CHOL 232 (H) 05/14/2019     Lab Results   Component Value Date    TRIG 364 (H) 01/17/2022    TRIG 208 (H) 07/19/2021    TRIG 220 (H) 05/14/2019     Lab Results   Component Value Date    HDL 36 (L) 01/17/2022    HDL 43 (L) 07/19/2021    HDL 48 (L) 05/14/2019     Lab Results   Component Value Date    LDLCALC 80 01/17/2022    LDLCALC 66 07/19/2021    LDLCALC 140 05/14/2019     Lab Results   Component Value Date     04/15/2022     03/07/2011    K 4.5 04/15/2022    K 4.3 10/06/2019    K 4.5 03/07/2011    CL 99 04/15/2022     03/07/2011    CO2 30 04/15/2022    BUN 22 04/15/2022    CREATININE 0.8 04/15/2022    CREATININE 0.8 03/07/2011    GLUCOSE 112 04/15/2022    CALCIUM 9.3 04/15/2022      Lab Results   Component Value Date    WBC 14.6 (H) 04/15/2022    HGB 16.0 04/15/2022    HCT 50.2 04/15/2022    MCV 99.0 (H) 04/15/2022     (H) 04/15/2022    LABLYMP 3.86 03/07/2011    LYMPHOPCT 26.1 04/15/2022    RBC 5.07 04/15/2022    MCH 31.6 (H) 04/15/2022    MCHC 31.9 (L) 04/15/2022    RDW 14.6 (H) 04/15/2022     Lab Results   Component Value Date    VITD25 30.5 04/15/2022       Subjective:      Review of Systems   Constitutional: Negative for fatigue, fever and unexpected weight change. HENT: Negative for ear discharge, ear pain, mouth sores, sore throat and trouble swallowing. Eyes: Negative for discharge, itching and visual disturbance. Respiratory: Negative for cough, choking, shortness of breath, wheezing and stridor. Cardiovascular: Negative for chest pain, palpitations and leg swelling. Gastrointestinal: Negative for abdominal distention, abdominal pain, blood in stool, constipation, diarrhea, nausea and vomiting. Endocrine: Negative for cold intolerance, polydipsia and polyuria. Genitourinary: Negative for difficulty urinating, dysuria, frequency and urgency. Musculoskeletal: Negative for arthralgias and gait problem. Skin: Negative for color change and rash. Allergic/Immunologic: Negative for food allergies and immunocompromised state. Neurological: Negative for dizziness, tremors, syncope, speech difficulty, weakness and headaches. Hematological: Negative for adenopathy. Does not bruise/bleed easily. Psychiatric/Behavioral: Negative for confusion and hallucinations.        Objective:     Physical Exam  Skin:            Comments: Spider bite in the area indicated does seem to be improving just continue with warm soaks       BP (!) 148/70   Pulse 94   SpO2 95% Assessment:      Problem List     Spider bite     Inside of right leg it has improved since his last visit the area and then and is not increasing in erythema is less and there is no longer any induration                Plan:        Patient given educational materials - see patient instructions. Discussed use, benefit, and side effects of prescribed medications. Allpatient questions answered. Pt voiced understanding. Reviewed health maintenance. Instructed to continue current medications, diet and exercise. Patient agreed with treatment plan. Follow up as directed. MEDICATIONS:  No orders of the defined types were placed in this encounter. ORDERS:  No orders of the defined types were placed in this encounter. Follow-up:  No follow-ups on file. PATIENT INSTRUCTIONS:  Patient Instructions   Spider bite left thigh thought this is getting better just continue to do warm soaks 2 to get worse area get bigger hard  Me know and we will get you an appointment with one of the surgeons for I&D    Electronically signed by DEMETRIS Bowers on 5/9/2022 at 1:58 PM        EMRDragon/transcription disclaimer:  Much of this encounter note is electronic transcription/translation of spoken language to printed texts. The electronic translation of spoken language may be erroneous, or at times,nonsensical words or phrases may be inadvertently transcribed.   Although I have reviewed the note for such errors, some may still exist.

## 2022-05-09 NOTE — ASSESSMENT & PLAN NOTE
Inside of right leg it has improved since his last visit the area and then and is not increasing in erythema is less and there is no longer any induration

## 2022-06-17 NOTE — TELEPHONE ENCOUNTER
Requested Prescriptions     Pending Prescriptions Disp Refills    hydroCHLOROthiazide (HYDRODIURIL) 25 MG tablet [Pharmacy Med Name: HYDROCHLOROTHIAZIDE 25 MG TAB] 90 tablet 1     Sig: TAKE 1 TABLET BY MOUTH EVERY DAY IN THE MORNING

## 2022-06-20 RX ORDER — HYDROCHLOROTHIAZIDE 25 MG/1
TABLET ORAL
Qty: 90 TABLET | Refills: 1 | Status: SHIPPED | OUTPATIENT
Start: 2022-06-20

## 2022-07-18 RX ORDER — FOLIC ACID 1 MG/1
1 TABLET ORAL DAILY
Qty: 90 TABLET | Refills: 1 | Status: SHIPPED | OUTPATIENT
Start: 2022-07-18

## 2022-07-18 NOTE — TELEPHONE ENCOUNTER
Omari called requesting a refill of the below medication which has been pended for you:     Requested Prescriptions     Pending Prescriptions Disp Refills    folic acid (FOLVITE) 1 MG tablet 90 tablet 1     Sig: Take 1 tablet by mouth in the morning. 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      Penicillins     Morphine Anxiety

## 2022-07-20 ENCOUNTER — OFFICE VISIT (OUTPATIENT)
Dept: INTERNAL MEDICINE | Age: 57
End: 2022-07-20
Payer: MEDICAID

## 2022-07-20 VITALS
HEART RATE: 66 BPM | TEMPERATURE: 97.8 F | WEIGHT: 220.5 LBS | SYSTOLIC BLOOD PRESSURE: 110 MMHG | BODY MASS INDEX: 30.75 KG/M2 | OXYGEN SATURATION: 96 % | DIASTOLIC BLOOD PRESSURE: 64 MMHG

## 2022-07-20 DIAGNOSIS — I15.9 SECONDARY HYPERTENSION: ICD-10-CM

## 2022-07-20 DIAGNOSIS — D75.839 THROMBOCYTOSIS: ICD-10-CM

## 2022-07-20 DIAGNOSIS — E78.2 MIXED HYPERLIPIDEMIA: ICD-10-CM

## 2022-07-20 DIAGNOSIS — Z12.5 ENCOUNTER FOR PROSTATE CANCER SCREENING: ICD-10-CM

## 2022-07-20 DIAGNOSIS — Z12.5 ENCOUNTER FOR PROSTATE CANCER SCREENING: Primary | ICD-10-CM

## 2022-07-20 DIAGNOSIS — M05.79 RHEUMATOID ARTHRITIS INVOLVING MULTIPLE SITES WITH POSITIVE RHEUMATOID FACTOR (HCC): ICD-10-CM

## 2022-07-20 DIAGNOSIS — E55.9 VITAMIN D DEFICIENCY: ICD-10-CM

## 2022-07-20 LAB
ALBUMIN SERPL-MCNC: 4.1 G/DL (ref 3.5–5.2)
ALP BLD-CCNC: 145 U/L (ref 40–130)
ALT SERPL-CCNC: 37 U/L (ref 5–41)
ANION GAP SERPL CALCULATED.3IONS-SCNC: 13 MMOL/L (ref 7–19)
AST SERPL-CCNC: 21 U/L (ref 5–40)
BASOPHILS ABSOLUTE: 0.1 K/UL (ref 0–0.2)
BASOPHILS RELATIVE PERCENT: 0.8 % (ref 0–1)
BILIRUB SERPL-MCNC: <0.2 MG/DL (ref 0.2–1.2)
BUN BLDV-MCNC: 16 MG/DL (ref 6–20)
CALCIUM SERPL-MCNC: 9.5 MG/DL (ref 8.6–10)
CHLORIDE BLD-SCNC: 100 MMOL/L (ref 98–111)
CHOLESTEROL, TOTAL: 151 MG/DL (ref 160–199)
CO2: 28 MMOL/L (ref 22–29)
CREAT SERPL-MCNC: 0.8 MG/DL (ref 0.5–1.2)
EOSINOPHILS ABSOLUTE: 0.3 K/UL (ref 0–0.6)
EOSINOPHILS RELATIVE PERCENT: 2.5 % (ref 0–5)
GFR AFRICAN AMERICAN: >59
GFR NON-AFRICAN AMERICAN: >60
GLUCOSE BLD-MCNC: 117 MG/DL (ref 74–109)
HCT VFR BLD CALC: 46.3 % (ref 42–52)
HDLC SERPL-MCNC: 36 MG/DL (ref 55–121)
HEMOGLOBIN: 15 G/DL (ref 14–18)
IMMATURE GRANULOCYTES #: 0.1 K/UL
LDL CHOLESTEROL CALCULATED: 77 MG/DL
LYMPHOCYTES ABSOLUTE: 3.7 K/UL (ref 1.1–4.5)
LYMPHOCYTES RELATIVE PERCENT: 28 % (ref 20–40)
MCH RBC QN AUTO: 32 PG (ref 27–31)
MCHC RBC AUTO-ENTMCNC: 32.4 G/DL (ref 33–37)
MCV RBC AUTO: 98.7 FL (ref 80–94)
MONOCYTES ABSOLUTE: 1.3 K/UL (ref 0–0.9)
MONOCYTES RELATIVE PERCENT: 9.7 % (ref 0–10)
NEUTROPHILS ABSOLUTE: 7.8 K/UL (ref 1.5–7.5)
NEUTROPHILS RELATIVE PERCENT: 58.6 % (ref 50–65)
PDW BLD-RTO: 14.4 % (ref 11.5–14.5)
PLATELET # BLD: 510 K/UL (ref 130–400)
PMV BLD AUTO: 8.7 FL (ref 9.4–12.4)
POTASSIUM SERPL-SCNC: 3.9 MMOL/L (ref 3.5–5)
PROSTATE SPECIFIC ANTIGEN: 1.46 NG/ML (ref 0–4)
RBC # BLD: 4.69 M/UL (ref 4.7–6.1)
SODIUM BLD-SCNC: 141 MMOL/L (ref 136–145)
TOTAL PROTEIN: 7.2 G/DL (ref 6.6–8.7)
TRIGL SERPL-MCNC: 190 MG/DL (ref 0–149)
WBC # BLD: 13.3 K/UL (ref 4.8–10.8)

## 2022-07-20 PROCEDURE — 99214 OFFICE O/P EST MOD 30 MIN: CPT | Performed by: NURSE PRACTITIONER

## 2022-07-20 RX ORDER — LOSARTAN POTASSIUM 100 MG/1
100 TABLET ORAL DAILY
Qty: 90 TABLET | Refills: 1 | Status: SHIPPED | OUTPATIENT
Start: 2022-07-20

## 2022-07-20 ASSESSMENT — ENCOUNTER SYMPTOMS
DIARRHEA: 0
STRIDOR: 0
TROUBLE SWALLOWING: 0
VOMITING: 0
CONSTIPATION: 0
ABDOMINAL PAIN: 0
SHORTNESS OF BREATH: 0
SORE THROAT: 0
BLOOD IN STOOL: 0
COLOR CHANGE: 0
EYE DISCHARGE: 0
EYE ITCHING: 0
COUGH: 0
NAUSEA: 0
WHEEZING: 0
ABDOMINAL DISTENTION: 0
CHOKING: 0

## 2022-07-20 ASSESSMENT — PATIENT HEALTH QUESTIONNAIRE - PHQ9
SUM OF ALL RESPONSES TO PHQ QUESTIONS 1-9: 0
2. FEELING DOWN, DEPRESSED OR HOPELESS: 0
1. LITTLE INTEREST OR PLEASURE IN DOING THINGS: 0
SUM OF ALL RESPONSES TO PHQ9 QUESTIONS 1 & 2: 0
SUM OF ALL RESPONSES TO PHQ QUESTIONS 1-9: 0

## 2022-07-20 NOTE — PATIENT INSTRUCTIONS
Hypertension we will stop the lisinopril start losartan 100 mg daily  2. Rheumatoid arthritis stable with methotrexate, let me know if you get your Medicare and we can then get you back in with Dr. Fior Vizcarra  3. Thrombocytosis followed by Martina Weiss  4. Hyperlipidemia stable on current dose of simvastatin next   #5 vitamin D deficiency stable with 50,000 units weekly  6.   Neuropathy if this continues to bother you we can certainly try the compounded ointment from [facility] before we actually take the pill

## 2022-07-20 NOTE — PROGRESS NOTES
[address] [facility]  [address] [address] [id number] [address] [address]  Dept: [phone]  Dept Fax: [phone]: [phone]    Reddy Evangelista (:  1965) is a 59 y.o. male,Established patient  with green, here for evaluation of the following chief complaint(s): Cough (Pt is having persistent cough would like something for it states the last antibiotic given did not help . ) and Other (Would like to discuss b/p meds )      Reddy Evangelista is a 59 y.o. male who presents today for his medical conditions/complaints as noted below. Reddy Evangelista is c/sharon Cough (Pt is having persistent cough would like something for it states the last antibiotic given did not help . ) and Other (Would like to discuss b/p meds )        HPI:     Chief Complaint   Patient presents with    Cough     Pt is having persistent cough would like something for it states the last antibiotic given did not help . Other     Would like to discuss b/p meds        HPI    Hypertension he is on HCTZ 25 and lisinopril 10;  he has been coughing a lot thinks it is the meds    RA multiple joints; now seeing Dr bill Donald  currently on methotrexate  3. Thrombocytosis;  followed by Tia Goldmann   4. Hyperlipidemia stable with current dose of simvastatin  5. Vitamin D deficiency stable with 50,000 units weekly  6.   Neuropathy  in both legs just started and mild  we are going to maybe use ointment; first is he needs anything     Past Medical History:   Diagnosis Date    Arthritis     Back pain     Elevated white blood cell count, unspecified     Gout     History of blood transfusion     Hypertension     Mixed hyperlipidemia 2021    Nicotine dependence, unspecified, uncomplicated       Past Surgical History:   Procedure Laterality Date    ABDOMINAL SURGERY      APPENDECTOMY      BREAST CYST INCISION AND DRAINAGE Left     Hip-MRSA    DENTAL SURGERY      Total dental extraction    FOREIGN BODY REMOVAL N/A 6/24/2019    RECTAL FOREIGN BODY REMOVAL performed by Kita Meng DO at [facility]    ? Vitals 7/20/2022 7/20/2022 5/9/2022 5/9/2022 4/18/2022 6/65/3713   SYSTOLIC 009 [phone] 363   DIASTOLIC 64 64 70 70 60 70   Pulse - 66 - 94 100 102   Temp - 97.8 - - - -   Resp - - - - - -   SpO2 - 96 - 95 96 99   Weight - 220 lb 8 oz - - 220 lb 221 lb   Height - - - - 5' 11\" 5' 10\"   Body mass index - - - - 30.68 kg/m2 31.71 kg/m2   Pain Level - - - - - -   Some recent data might be hidden       Family History   Problem Relation Age of Onset    Diabetes Father     Arthritis Father     Stroke Father     Colon Cancer Maternal Grandfather        Social History     Tobacco Use    Smoking status: Every Day     Packs/day: 1.00     Years: 41.00     Pack years: 41.00     Types: Cigarettes    Smokeless tobacco: Current   Substance Use Topics    Alcohol use: No      Current Outpatient Medications   Medication Sig Dispense Refill    losartan (COZAAR) 100 MG tablet Take 1 tablet by mouth in the morning. 90 tablet 1    folic acid (FOLVITE) 1 MG tablet Take 1 tablet by mouth in the morning.  90 tablet 1    hydroCHLOROthiazide (HYDRODIURIL) 25 MG tablet TAKE 1 TABLET BY MOUTH EVERY DAY IN THE MORNING 90 tablet 1    magnesium gluconate (MAGONATE) 500 MG tablet Take 500 mg by mouth every other day      Potassium 75 MG TABS Take by mouth every other day      methotrexate (RHEUMATREX) 2.5 MG chemo tablet TAKE 10 TABLETS EVERY WEDNESDAY 120 tablet 1    simvastatin (ZOCOR) 20 MG tablet TAKE 1 TABLET BY MOUTH EVERY DAY AT NIGHT 30 tablet 5    zinc gluconate 50 MG tablet Take 50 mg by mouth daily      vitamin C (ASCORBIC ACID) 500 MG tablet Take 500 mg by mouth daily      Cetirizine HCl (ZYRTEC ALLERGY) 10 MG CAPS Take by mouth      vitamin D (ERGOCALCIFEROL) 1.25 MG (25578 UT) CAPS capsule Take 1 capsule by mouth once a week 30 capsule 3     No current facility-administered medications for this visit. 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      Penicillins     Morphine Anxiety       Health Maintenance   Topic Date Due    COVID-19 Vaccine (1) Never done    Meningococcal (ACWY) vaccine (1 - Risk start 2-23 months series) Never done    Prostate Specific Antigen (PSA) Screening or Monitoring  07/19/2022    Hib vaccine (1 of 1 - Risk 1-dose series) 07/20/2022 (Originally 4/18/1966)    DTaP/Tdap/Td vaccine (1 - Tdap) 07/20/2022 (Originally 1/18/1982)    Shingles vaccine (1 of 2) 07/20/2022 (Originally 1/18/1982)    Meningococcal B vaccine (1 of 4 - Increased Risk Bexsero 2-dose series) 07/20/2022 (Originally 1/18/1973)    Pneumococcal 0-64 years Vaccine (1 - PCV) 08/06/2035 (Originally 1/18/1969)    Flu vaccine (1) 09/01/2022    Lipids  01/17/2023    A1C test (Diabetic or Prediabetic)  04/15/2023    Depression Screen  07/20/2023    Colorectal Cancer Screen  07/15/2024    Hepatitis C screen  Completed    HIV screen  Completed    Hepatitis A vaccine  Aged Out    Hepatitis B vaccine  Aged Out    Low dose CT lung screening  Discontinued       Lab Results   Component Value Date    LABA1C 6.0 04/15/2022     Lab Results   Component Value Date    PSA 0.92 07/19/2021    PSA 1.57 05/14/2019     TSH   Date Value Ref Range Status   01/17/2022 1.680 0.270 - 4.200 uIU/mL Final   ]  Lab Results   Component Value Date     04/15/2022    K 4.5 04/15/2022    CL 99 04/15/2022    CO2 30 (H) 04/15/2022    BUN 22 (H) 04/15/2022    CREATININE 0.8 04/15/2022    GLUCOSE 112 (H) 04/15/2022    CALCIUM 9.3 04/15/2022    PROT 6.8 04/15/2022    LABALBU 4.4 04/15/2022    BILITOT <0.2 04/15/2022    ALKPHOS 142 (H) 04/15/2022    AST 42 (H) 04/15/2022    ALT 79 (H) 04/15/2022    LABGLOM >60 04/15/2022    GFRAA >59 04/15/2022     Lab Results Component Value Date    CHOL 189 01/17/2022    CHOL 151 (L) 07/19/2021    CHOL 232 (H) 05/14/2019     Lab Results   Component Value Date    TRIG 364 (H) 01/17/2022    TRIG 208 (H) 07/19/2021    TRIG 220 (H) 05/14/2019     Lab Results   Component Value Date    HDL 36 (L) 01/17/2022    HDL 43 (L) 07/19/2021    HDL 48 (L) 05/14/2019     Lab Results   Component Value Date    LDLCALC 80 01/17/2022    LDLCALC 66 07/19/2021    LDLCALC 140 05/14/2019     Lab Results   Component Value Date/Time     04/15/2022 07:27 AM     03/07/2011 08:35 AM    K 4.5 04/15/2022 07:27 AM    K 4.3 10/06/2019 05:15 PM    K 4.5 03/07/2011 08:35 AM    CL 99 04/15/2022 07:27 AM     03/07/2011 08:35 AM    CO2 30 04/15/2022 07:27 AM    BUN 22 04/15/2022 07:27 AM    CREATININE 0.8 04/15/2022 07:27 AM    CREATININE 0.8 03/07/2011 08:35 AM    GLUCOSE 112 04/15/2022 07:27 AM    CALCIUM 9.3 04/15/2022 07:27 AM      Lab Results   Component Value Date    WBC 14.6 (H) 04/15/2022    HGB 16.0 04/15/2022    HCT 50.2 04/15/2022    MCV 99.0 (H) 04/15/2022     (H) 04/15/2022    LABLYMP 3.86 03/07/2011    LYMPHOPCT 26.1 04/15/2022    RBC 5.07 04/15/2022    MCH 31.6 (H) 04/15/2022    MCHC 31.9 (L) 04/15/2022    RDW 14.6 (H) 04/15/2022     Lab Results   Component Value Date    VITD25 30.5 04/15/2022     Labs reviewed from today    Subjective:      Review of Systems   Constitutional:  Negative for fatigue, fever and unexpected weight change. HENT:  Negative for ear discharge, ear pain, mouth sores, sore throat and trouble swallowing. Eyes:  Negative for discharge, itching and visual disturbance. Respiratory:  Negative for cough, choking, shortness of breath, wheezing and stridor. Cardiovascular:  Negative for chest pain, palpitations and leg swelling. Gastrointestinal:  Negative for abdominal distention, abdominal pain, blood in stool, constipation, diarrhea, nausea and vomiting.    Endocrine: Negative for cold intolerance, polydipsia and polyuria. Genitourinary:  Negative for difficulty urinating, dysuria, frequency and urgency. Musculoskeletal:  Positive for arthralgias. Negative for gait problem. Skin:  Negative for color change and rash. Allergic/Immunologic: Negative for food allergies and immunocompromised state. Neurological:  Negative for dizziness, tremors, syncope, speech difficulty, weakness and headaches. Hematological:  Negative for adenopathy. Does not bruise/bleed easily. Psychiatric/Behavioral:  Negative for confusion and hallucinations. Objective:     Physical Exam  Constitutional:       General: He is not in acute distress. Appearance: He is well-developed. HENT:      Head: Normocephalic and atraumatic. Eyes:      General: No scleral icterus. Right eye: No discharge. Left eye: No discharge. Pupils: Pupils are equal, round, and reactive to light. Neck:      Thyroid: No thyromegaly. Vascular: No JVD. Cardiovascular:      Rate and Rhythm: Normal rate and regular rhythm. Heart sounds: Normal heart sounds. No murmur heard. Pulmonary:      Effort: Pulmonary effort is normal. No respiratory distress. Breath sounds: Normal breath sounds. No wheezing or rales. Abdominal:      General: Bowel sounds are normal. There is no distension. Palpations: Abdomen is soft. There is no mass. Tenderness: There is no abdominal tenderness. There is no guarding or rebound. Musculoskeletal:         General: No tenderness. Normal range of motion. Cervical back: Normal range of motion and neck supple. Skin:     General: Skin is warm and dry. Findings: No erythema or rash. Neurological:      Mental Status: He is alert and oriented to person, place, and time. Cranial Nerves: No cranial nerve deficit. Coordination: Coordination normal.      Deep Tendon Reflexes: Reflexes are normal and symmetric.  Reflexes normal.   Psychiatric:         Mood and Affect: Mood is not depressed. Behavior: Behavior normal.         Thought Content: Thought content normal.         Judgment: Judgment normal.     /64   Pulse 66   Temp 97.8 °F (36.6 °C)   Wt 220 lb 8 oz (100 kg)   SpO2 96%   BMI 30.75 kg/m²           Assessment:      Problem List       Hypertension     Stable with current dose of lisinopril 10 and HCTZ 25          Mixed hyperlipidemia     Stable with simvastatin          Relevant Medications    simvastatin (ZOCOR) 20 MG tablet    hydroCHLOROthiazide (HYDRODIURIL) 25 MG tablet    losartan (COZAAR) 100 MG tablet    Rheumatoid arthritis involving multiple sites with positive rheumatoid factor (HCC)     Currently seen Dr. Faustino Shah with methotrexate          Relevant Medications    methotrexate (RHEUMATREX) 2.5 MG chemo tablet    Thrombocytosis     Followed by Elton Lemus this is stable          Vitamin D deficiency     Stable with 50,000 units weekly             Plan:        Patient given educational materials - see patient instructions. Discussed use, benefit, and side effects of prescribed medications. Allpatient questions answered. Pt voiced understanding. Reviewed health maintenance. Instructed to continue current medications, diet and exercise. Patient agreed with treatment plan. Follow up as directed. MEDICATIONS:  Orders Placed This Encounter   Medications    losartan (COZAAR) 100 MG tablet     Sig: Take 1 tablet by mouth in the morning. Dispense:  90 tablet     Refill:  1           ORDERS:  No orders of the defined types were placed in this encounter. Follow-up:  Return in about 6 months (around 1/20/2023) for have labs done prior to appt. PATIENT INSTRUCTIONS:  Patient Instructions    Hypertension we will stop the lisinopril start losartan 100 mg daily  2. Rheumatoid arthritis stable with methotrexate, let me know if you get your Medicare and we can then get you back in with Dr. Faustino Shah  3.   Thrombocytosis followed by Zechariah Aid  4. Hyperlipidemia stable on current dose of simvastatin next   #5 vitamin D deficiency stable with 50,000 units weekly  6. Neuropathy if this continues to bother you we can certainly try the compounded ointment from [facility] before we actually take the pill  Electronically signed by Karyl Osler, APRN on 7/20/2022 at 8:45 AM    @    Lizzie/transcription disclaimer:  Much of this encounter note is electronic transcription/translation of spoken language to printed texts. The electronic translation of spoken language may be erroneous, or at times,nonsensical words or phrases may be inadvertently transcribed.   Although I have reviewed the note for such errors, some may still exist.

## 2022-08-03 RX ORDER — ERGOCALCIFEROL 1.25 MG/1
50000 CAPSULE ORAL WEEKLY
Qty: 30 CAPSULE | Refills: 3 | Status: SHIPPED | OUTPATIENT
Start: 2022-08-03

## 2022-08-03 NOTE — TELEPHONE ENCOUNTER
Kannan Whitten called to request a refill on his medication.       Last office visit : 7/20/2022   Next office visit : 1/23/2023     Requested Prescriptions     Pending Prescriptions Disp Refills    vitamin D (ERGOCALCIFEROL) 1.25 MG (37754 UT) CAPS capsule 30 capsule 3     Sig: Take 1 capsule by mouth once a week            Pancho Diesel, [place]

## 2022-08-05 DIAGNOSIS — E78.2 MIXED HYPERLIPIDEMIA: ICD-10-CM

## 2022-08-05 NOTE — TELEPHONE ENCOUNTER
Omari called requesting a refill of the below medication which has been pended for you:     Requested Prescriptions     Pending Prescriptions Disp Refills    simvastatin (ZOCOR) 20 MG tablet [Pharmacy Med Name: SIMVASTATIN 20 MG TABLET] 30 tablet 5     Sig: TAKE 1 TABLET BY MOUTH EVERY DAY AT NIGHT       Last Appointment Date: 7/20/2022  Next Appointment Date: 1/23/2023    Allergies   Allergen Reactions    Aspirin      PAtient is not to take ASA r/t  No spleen     Ativan [Lorazepam] Other (See Comments)     Altered mental status    Codeine Other (See Comments)     Mental changes  .      Penicillins     Morphine Anxiety

## 2022-08-08 RX ORDER — IBUPROFEN 800 MG/1
800 TABLET ORAL
Qty: 270 TABLET | Refills: 1 | Status: SHIPPED | OUTPATIENT
Start: 2022-08-08 | End: 2022-10-04

## 2022-08-08 RX ORDER — METHYLPREDNISOLONE 4 MG/1
TABLET ORAL
Qty: 1 KIT | Refills: 1 | Status: SHIPPED | OUTPATIENT
Start: 2022-08-08 | End: 2022-08-14

## 2022-08-08 RX ORDER — SIMVASTATIN 20 MG
TABLET ORAL
Qty: 30 TABLET | Refills: 5 | Status: SHIPPED | OUTPATIENT
Start: 2022-08-08

## 2022-08-22 NOTE — TELEPHONE ENCOUNTER
Omari called requesting a refill of the below medication which has been pended for you:     Requested Prescriptions     Pending Prescriptions Disp Refills    methotrexate (RHEUMATREX) 2.5 MG chemo tablet [Pharmacy Med Name: METHOTREXATE 2.5 MG TABLET] 120 tablet 1     Sig: TAKE 10 TABLETS BY MOUTH EVERY WEDNESDAY       Last Appointment Date: 7/20/2022  Next Appointment Date: 1/23/2023    Allergies   Allergen Reactions    Aspirin      PAtient is not to take ASA r/t  No spleen     Ativan [Lorazepam] Other (See Comments)     Altered mental status    Codeine Other (See Comments)     Mental changes  .      Penicillins     Morphine Anxiety

## 2022-09-07 RX ORDER — BENZONATATE 200 MG/1
200 CAPSULE ORAL 2 TIMES DAILY PRN
Qty: 60 CAPSULE | Refills: 1 | Status: SHIPPED | OUTPATIENT
Start: 2022-09-07

## 2022-09-07 NOTE — TELEPHONE ENCOUNTER
Omari called requesting a refill of the below medication which has been pended for you:     Requested Prescriptions     Pending Prescriptions Disp Refills    benzonatate (TESSALON) 200 MG capsule [Pharmacy Med Name: BENZONATATE 200 MG CAPSULE] 60 capsule 1     Sig: TAKE 1 CAPSULE BY MOUTH 2 TIMES DAILY AS NEEDED FOR COUGH       Last Appointment Date: 7/20/2022  Next Appointment Date: 1/23/2023    Allergies   Allergen Reactions    Aspirin      PAtient is not to take ASA r/t  No spleen     Ativan [Lorazepam] Other (See Comments)     Altered mental status    Codeine Other (See Comments)     Mental changes  .      Penicillins     Morphine Anxiety

## 2022-10-03 DIAGNOSIS — R60.9 EDEMA, UNSPECIFIED TYPE: Primary | ICD-10-CM

## 2022-10-04 ENCOUNTER — OFFICE VISIT (OUTPATIENT)
Dept: INTERNAL MEDICINE | Age: 57
End: 2022-10-04
Payer: MEDICAID

## 2022-10-04 VITALS
OXYGEN SATURATION: 97 % | SYSTOLIC BLOOD PRESSURE: 138 MMHG | DIASTOLIC BLOOD PRESSURE: 68 MMHG | HEIGHT: 71 IN | HEART RATE: 100 BPM | WEIGHT: 230 LBS | BODY MASS INDEX: 32.2 KG/M2

## 2022-10-04 DIAGNOSIS — I15.9 SECONDARY HYPERTENSION: ICD-10-CM

## 2022-10-04 DIAGNOSIS — M05.79 RHEUMATOID ARTHRITIS INVOLVING MULTIPLE SITES WITH POSITIVE RHEUMATOID FACTOR (HCC): ICD-10-CM

## 2022-10-04 DIAGNOSIS — R60.0 LOCALIZED EDEMA: Primary | ICD-10-CM

## 2022-10-04 DIAGNOSIS — U07.1 COVID: ICD-10-CM

## 2022-10-04 PROCEDURE — 99213 OFFICE O/P EST LOW 20 MIN: CPT | Performed by: NURSE PRACTITIONER

## 2022-10-04 RX ORDER — FUROSEMIDE 20 MG/1
20 TABLET ORAL DAILY
Qty: 60 TABLET | Refills: 3 | Status: SHIPPED | OUTPATIENT
Start: 2022-10-04

## 2022-10-04 SDOH — ECONOMIC STABILITY: FOOD INSECURITY: WITHIN THE PAST 12 MONTHS, YOU WORRIED THAT YOUR FOOD WOULD RUN OUT BEFORE YOU GOT MONEY TO BUY MORE.: NEVER TRUE

## 2022-10-04 SDOH — ECONOMIC STABILITY: FOOD INSECURITY: WITHIN THE PAST 12 MONTHS, THE FOOD YOU BOUGHT JUST DIDN'T LAST AND YOU DIDN'T HAVE MONEY TO GET MORE.: NEVER TRUE

## 2022-10-04 ASSESSMENT — SOCIAL DETERMINANTS OF HEALTH (SDOH): HOW HARD IS IT FOR YOU TO PAY FOR THE VERY BASICS LIKE FOOD, HOUSING, MEDICAL CARE, AND HEATING?: NOT HARD AT ALL

## 2022-10-04 ASSESSMENT — ENCOUNTER SYMPTOMS
VOMITING: 0
CHOKING: 0
STRIDOR: 0
SHORTNESS OF BREATH: 0
CONSTIPATION: 0
DIARRHEA: 0
COUGH: 0
SORE THROAT: 0
ABDOMINAL PAIN: 0
ABDOMINAL DISTENTION: 0
EYE DISCHARGE: 0
BLOOD IN STOOL: 0
COLOR CHANGE: 0
NAUSEA: 0
WHEEZING: 0
EYE ITCHING: 0
TROUBLE SWALLOWING: 0

## 2022-10-04 NOTE — PATIENT INSTRUCTIONS
Edema  add lasix 20 mg daily  for 3-5 days;  if this helps your swelling you can just use it as needed;    #2 hypertension we will monitor this Lasix should help bring it down a little bit as well.

## 2022-10-04 NOTE — PROGRESS NOTES
[facility] PHYSICIAN SERVICES  [facility] INTERNAL MEDICINE  [address] [address] [place] [address]  Dept: [phone]  Dept Fax: [phone]: [phone]    Charissa Yee (:  1965) is a 59 y.o. male,Established patient  with green, here for evaluation of the following chief complaint(s): Edema      Charissa Yee is a 59 y.o. male who presents today for his medical conditions/complaints as noted below. Charissa Yee is c/sharon Edema      HPI:     Chief Complaint   Patient presents with    Edema     HPI   Swelling of both feet and legs for the last couple days. He thought his blood pressure was also up just a little bit. At home he was getting in the 150s and 160s. Here today 138/68 he does not really have joint pain like usually with his rheumatoid he did call and ask us about labs and we lore inflammatory markers yesterday which were all okay. #2 hypertension he is on HCTZ and losartan 100    Past Medical History:   Diagnosis Date    Arthritis     Back pain     Elevated white blood cell count, unspecified     Gout     History of blood transfusion     Hypertension     Mixed hyperlipidemia 2021    Nicotine dependence, unspecified, uncomplicated       Past Surgical History:   Procedure Laterality Date    ABDOMEN SURGERY      APPENDECTOMY      CYST INCISION AND DRAINAGE Left     Hip-MRSA    DENTAL SURGERY      Total dental extraction    FOREIGN BODY REMOVAL N/A 2019    RECTAL FOREIGN BODY REMOVAL performed by Stephanie Brenner DO at [facility]    ?        Vitals 10/4/2022 2022 2022 2022 2022 3/18/4157   SYSTOLIC 105 [phone]   DIASTOLIC 68 64 64 70 70 60   Pulse 100 - 66 - 94 100   Temp - - 97.8 - - -   Resp - - - - - -   SpO2 97 - 96 - 95 96   Weight 230 lb - 220 lb 8 oz - - 220 lb   Height 5' 11\" - - - - 5' 11\"   Body mass index 32.07 kg/m2 - - - - 30.68 kg/m2   Pain Level - - - - - -   Some recent data might be hidden       Family History   Problem Relation Age of Onset    Diabetes Father     Arthritis Father     Stroke Father     Colon Cancer Maternal Grandfather        Social History     Tobacco Use    Smoking status: Every Day     Packs/day: 1.00     Years: 41.00     Pack years: 41.00     Types: Cigarettes    Smokeless tobacco: Current   Substance Use Topics    Alcohol use: No      Current Outpatient Medications   Medication Sig Dispense Refill    furosemide (LASIX) 20 MG tablet Take 1 tablet by mouth daily 60 tablet 3    methotrexate (RHEUMATREX) 2.5 MG chemo tablet TAKE 10 TABLETS BY MOUTH EVERY WEDNESDAY 120 tablet 1    benzonatate (TESSALON) 200 MG capsule TAKE 1 CAPSULE BY MOUTH 2 TIMES DAILY AS NEEDED FOR COUGH 60 capsule 1    simvastatin (ZOCOR) 20 MG tablet TAKE 1 TABLET BY MOUTH EVERY DAY AT NIGHT 30 tablet 5    vitamin D (ERGOCALCIFEROL) 1.25 MG (20847 UT) CAPS capsule Take 1 capsule by mouth once a week 30 capsule 3    losartan (COZAAR) 100 MG tablet Take 1 tablet by mouth in the morning. 90 tablet 1    folic acid (FOLVITE) 1 MG tablet Take 1 tablet by mouth in the morning. 90 tablet 1    hydroCHLOROthiazide (HYDRODIURIL) 25 MG tablet TAKE 1 TABLET BY MOUTH EVERY DAY IN THE MORNING 90 tablet 1    magnesium gluconate (MAGONATE) 500 MG tablet Take 500 mg by mouth every other day      Potassium 75 MG TABS Take by mouth every other day      zinc gluconate 50 MG tablet Take 50 mg by mouth daily      vitamin C (ASCORBIC ACID) 500 MG tablet Take 500 mg by mouth daily      Cetirizine HCl (ZYRTEC ALLERGY) 10 MG CAPS Take by mouth       No current facility-administered medications for this visit. 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      Penicillins     Morphine Anxiety       Health Maintenance   Topic Date Due    COVID-19 Vaccine (1) Never done    Meningococcal (ACWY) vaccine (1 - Risk start 2-23 months series) Never done    Hib vaccine (1 of 1 - Risk 1-dose series) Never done    Meningococcal B vaccine (1 of 4 - Increased Risk Bexsero 2-dose series) Never done    DTaP/Tdap/Td vaccine (1 - Tdap) Never done    Shingles vaccine (1 of 2) Never done    Flu vaccine (1) Never done    Pneumococcal 0-64 years Vaccine (1 - PCV) 08/06/2035 (Originally 1/18/1971)    A1C test (Diabetic or Prediabetic)  04/15/2023    Lipids  07/20/2023    Depression Screen  07/20/2023    Colorectal Cancer Screen  07/15/2024    Hepatitis C screen  Completed    HIV screen  Completed    Hepatitis A vaccine  Aged Out    Hepatitis B vaccine  Aged Out    Low dose CT lung screening  Discontinued       Lab Results   Component Value Date    LABA1C 6.0 04/15/2022     Lab Results   Component Value Date    PSA 1.46 07/20/2022    PSA 0.92 07/19/2021    PSA 1.57 05/14/2019     TSH   Date Value Ref Range Status   01/17/2022 1.680 0.270 - 4.200 uIU/mL Final   ]  Lab Results   Component Value Date     07/20/2022    K 3.9 07/20/2022     07/20/2022    CO2 28 07/20/2022    BUN 16 07/20/2022    CREATININE 0.8 07/20/2022    GLUCOSE 117 (H) 07/20/2022    CALCIUM 9.5 07/20/2022    PROT 7.2 07/20/2022    LABALBU 4.1 07/20/2022    BILITOT <0.2 07/20/2022    ALKPHOS 145 (H) 07/20/2022    AST 21 07/20/2022    ALT 37 07/20/2022    LABGLOM >60 07/20/2022    GFRAA >59 07/20/2022     Lab Results   Component Value Date    CHOL 151 (L) 07/20/2022    CHOL 189 01/17/2022    CHOL 151 (L) 07/19/2021     Lab Results   Component Value Date    TRIG 190 (H) 07/20/2022    TRIG 364 (H) 01/17/2022    TRIG 208 (H) 07/19/2021     Lab Results   Component Value Date    HDL 36 (L) 07/20/2022    HDL 36 (L) 01/17/2022    HDL 43 (L) 07/19/2021     Lab Results   Component Value Date    LDLCALC 77 07/20/2022    LDLCALC 80 01/17/2022    LDLCALC 66 07/19/2021     Lab Results   Component Value Date/Time     07/20/2022 07:46 AM     03/07/2011 08:35 AM    K 3.9 07/20/2022 07:46 AM    K 4.3 10/06/2019 05:15 PM    K 4.5 03/07/2011 08:35 AM     07/20/2022 07:46 AM     03/07/2011 08:35 AM    CO2 28 07/20/2022 07:46 AM    BUN 16 07/20/2022 07:46 AM    CREATININE 0.8 07/20/2022 07:46 AM    CREATININE 0.8 03/07/2011 08:35 AM    GLUCOSE 117 07/20/2022 07:46 AM    CALCIUM 9.5 07/20/2022 07:46 AM      Lab Results   Component Value Date    WBC 13.3 (H) 07/20/2022    HGB 15.0 07/20/2022    HCT 46.3 07/20/2022    MCV 98.7 (H) 07/20/2022     (H) 07/20/2022    LABLYMP 3.86 03/07/2011    LYMPHOPCT 28.0 07/20/2022    RBC 4.69 (L) 07/20/2022    MCH 32.0 (H) 07/20/2022    MCHC 32.4 (L) 07/20/2022    RDW 14.4 07/20/2022     Lab Results   Component Value Date    VITD25 30.5 04/15/2022     Labs reviewed from today    Subjective:      Review of Systems   Constitutional:  Negative for fatigue, fever and unexpected weight change. HENT:  Negative for ear discharge, ear pain, mouth sores, sore throat and trouble swallowing. Eyes:  Negative for discharge, itching and visual disturbance. Respiratory:  Negative for cough, choking, shortness of breath, wheezing and stridor. Cardiovascular:  Negative for chest pain, palpitations and leg swelling. Gastrointestinal:  Negative for abdominal distention, abdominal pain, blood in stool, constipation, diarrhea, nausea and vomiting. Endocrine: Negative for cold intolerance, polydipsia and polyuria. Genitourinary:  Negative for difficulty urinating, dysuria, frequency and urgency. Musculoskeletal:  Positive for arthralgias and myalgias. Negative for gait problem. Skin:  Negative for color change and rash. Allergic/Immunologic: Negative for food allergies and immunocompromised state. Neurological:  Negative for dizziness, tremors, syncope, speech difficulty, weakness and headaches. Hematological:  Negative for adenopathy. Does not bruise/bleed easily. Psychiatric/Behavioral:  Negative for confusion and hallucinations. Objective:     Physical Exam  Constitutional:       General: He is not in acute distress. Appearance: He is well-developed. HENT:      Head: Normocephalic and atraumatic. Eyes:      General: No scleral icterus. Right eye: No discharge. Left eye: No discharge. Pupils: Pupils are equal, round, and reactive to light. Neck:      Thyroid: No thyromegaly. Vascular: No JVD. Cardiovascular:      Rate and Rhythm: Normal rate and regular rhythm. Heart sounds: Normal heart sounds. No murmur heard. Pulmonary:      Effort: Pulmonary effort is normal. No respiratory distress. Breath sounds: Normal breath sounds. No wheezing or rales. Abdominal:      General: Bowel sounds are normal. There is no distension. Palpations: Abdomen is soft. There is no mass. Tenderness: There is no abdominal tenderness. There is no guarding or rebound. Musculoskeletal:         General: No tenderness. Normal range of motion. Cervical back: Normal range of motion and neck supple. Skin:     General: Skin is warm and dry. Findings: No erythema or rash. Neurological:      Mental Status: He is alert and oriented to person, place, and time. Cranial Nerves: No cranial nerve deficit. Coordination: Coordination normal.      Deep Tendon Reflexes: Reflexes are normal and symmetric. Reflexes normal.   Psychiatric:         Mood and Affect: Mood is not depressed. Behavior: Behavior normal.         Thought Content: Thought content normal.         Judgment: Judgment normal.     /68   Pulse 100   Ht 5' 11\" (1.803 m)   Wt 230 lb (104.3 kg)   SpO2 97%   BMI 32.08 kg/m²           Assessment:      Problem List       Localized edema - Primary     Swelling bilateral lower extremities 3+ pitting we will add Lasix 20 to take 3 to 4 days. And may be in the future just like Monday Wednesday and Fri. Relevant Medications    hydroCHLOROthiazide (HYDRODIURIL) 25 MG tablet    losartan (COZAAR) 100 MG tablet    simvastatin (ZOCOR) 20 MG tablet    furosemide (LASIX) 20 MG tablet    Hypertension     Stable with HCTZ and losartan 100 we will add Lasix 20          Rheumatoid arthritis involving multiple sites with positive rheumatoid factor (HCC)     Joints swollen and painful today           Relevant Medications    methotrexate (RHEUMATREX) 2.5 MG chemo tablet       Plan:        Patient given educational materials - see patient instructions. Discussed use, benefit, and side effects of prescribed medications. Allpatient questions answered. Pt voiced understanding. Reviewed health maintenance. Instructed to continue current medications, diet and exercise. Patient agreed with treatment plan. Follow up as directed. MEDICATIONS:  Orders Placed This Encounter   Medications    furosemide (LASIX) 20 MG tablet     Sig: Take 1 tablet by mouth daily     Dispense:  60 tablet     Refill:  3           ORDERS:  No orders of the defined types were placed in this encounter. Follow-up:  No follow-ups on file. PATIENT INSTRUCTIONS:  Patient Instructions    Edema  add lasix 20 mg daily  for 3-5 days;  if this helps your swelling you can just use it as needed;    Electronically signed by DEMETRIS Lou on 10/4/2022 at 12:03 PM    @    [facility] Est[facility] Medical CenterDragon/transcription disclaimer:  Much of this encounter note is electronic transcription/translation of spoken language to printed texts. The electronic translation of spoken language may be erroneous, or at times,nonsensical words or phrases may be inadvertently transcribed.   Although I have reviewed the note for such errors, some may still exist.

## 2022-10-04 NOTE — ASSESSMENT & PLAN NOTE
Swelling bilateral lower extremities 3+ pitting we will add Lasix 20 to take 3 to 4 days. And may be in the future just like Monday Wednesday and Fri.

## 2022-11-07 ENCOUNTER — OFFICE VISIT (OUTPATIENT)
Dept: INTERNAL MEDICINE | Age: 57
End: 2022-11-07
Payer: MEDICARE

## 2022-11-07 VITALS
HEIGHT: 71 IN | OXYGEN SATURATION: 95 % | SYSTOLIC BLOOD PRESSURE: 138 MMHG | HEART RATE: 97 BPM | TEMPERATURE: 98.6 F | WEIGHT: 239 LBS | DIASTOLIC BLOOD PRESSURE: 70 MMHG | BODY MASS INDEX: 33.46 KG/M2

## 2022-11-07 DIAGNOSIS — R60.9 DEPENDENT EDEMA: ICD-10-CM

## 2022-11-07 DIAGNOSIS — R60.9 EDEMA, UNSPECIFIED TYPE: Primary | ICD-10-CM

## 2022-11-07 DIAGNOSIS — U07.1 COVID: ICD-10-CM

## 2022-11-07 DIAGNOSIS — R60.9 EDEMA, UNSPECIFIED TYPE: ICD-10-CM

## 2022-11-07 LAB
ALBUMIN SERPL-MCNC: 4.2 G/DL (ref 3.5–5.2)
ALP BLD-CCNC: 136 U/L (ref 40–130)
ALT SERPL-CCNC: 47 U/L (ref 5–41)
ANION GAP SERPL CALCULATED.3IONS-SCNC: 10 MMOL/L (ref 7–19)
AST SERPL-CCNC: 26 U/L (ref 5–40)
BASOPHILS ABSOLUTE: 0.1 K/UL (ref 0–0.2)
BASOPHILS RELATIVE PERCENT: 1 % (ref 0–1)
BILIRUB SERPL-MCNC: <0.2 MG/DL (ref 0.2–1.2)
BUN BLDV-MCNC: 18 MG/DL (ref 6–20)
C-REACTIVE PROTEIN: 0.53 MG/DL (ref 0–0.5)
CALCIUM SERPL-MCNC: 9.5 MG/DL (ref 8.6–10)
CHLORIDE BLD-SCNC: 99 MMOL/L (ref 98–111)
CO2: 32 MMOL/L (ref 22–29)
CREAT SERPL-MCNC: 0.9 MG/DL (ref 0.5–1.2)
EOSINOPHILS ABSOLUTE: 0.3 K/UL (ref 0–0.6)
EOSINOPHILS RELATIVE PERCENT: 2.1 % (ref 0–5)
GFR SERPL CREATININE-BSD FRML MDRD: >60 ML/MIN/{1.73_M2}
GLUCOSE BLD-MCNC: 140 MG/DL (ref 74–109)
HCT VFR BLD CALC: 47.7 % (ref 42–52)
HEMOGLOBIN: 16 G/DL (ref 14–18)
IMMATURE GRANULOCYTES #: 0 K/UL
LYMPHOCYTES ABSOLUTE: 3.3 K/UL (ref 1.1–4.5)
LYMPHOCYTES RELATIVE PERCENT: 24.3 % (ref 20–40)
MCH RBC QN AUTO: 32.7 PG (ref 27–31)
MCHC RBC AUTO-ENTMCNC: 33.5 G/DL (ref 33–37)
MCV RBC AUTO: 97.5 FL (ref 80–94)
MONOCYTES ABSOLUTE: 1.2 K/UL (ref 0–0.9)
MONOCYTES RELATIVE PERCENT: 8.7 % (ref 0–10)
NEUTROPHILS ABSOLUTE: 8.7 K/UL (ref 1.5–7.5)
NEUTROPHILS RELATIVE PERCENT: 63.6 % (ref 50–65)
PDW BLD-RTO: 14.2 % (ref 11.5–14.5)
PLATELET # BLD: 459 K/UL (ref 130–400)
PMV BLD AUTO: 9 FL (ref 9.4–12.4)
POTASSIUM SERPL-SCNC: 4.2 MMOL/L (ref 3.5–5)
RBC # BLD: 4.89 M/UL (ref 4.7–6.1)
SARS-COV-2 ANTIBODY, TOTAL: POSITIVE
SEDIMENTATION RATE, ERYTHROCYTE: 2 MM/HR (ref 0–15)
SODIUM BLD-SCNC: 141 MMOL/L (ref 136–145)
TOTAL PROTEIN: 7.1 G/DL (ref 6.6–8.7)
WBC # BLD: 13.6 K/UL (ref 4.8–10.8)

## 2022-11-07 PROCEDURE — 3074F SYST BP LT 130 MM HG: CPT | Performed by: NURSE PRACTITIONER

## 2022-11-07 PROCEDURE — 4004F PT TOBACCO SCREEN RCVD TLK: CPT | Performed by: NURSE PRACTITIONER

## 2022-11-07 PROCEDURE — G8427 DOCREV CUR MEDS BY ELIG CLIN: HCPCS | Performed by: NURSE PRACTITIONER

## 2022-11-07 PROCEDURE — G8484 FLU IMMUNIZE NO ADMIN: HCPCS | Performed by: NURSE PRACTITIONER

## 2022-11-07 PROCEDURE — 3078F DIAST BP <80 MM HG: CPT | Performed by: NURSE PRACTITIONER

## 2022-11-07 PROCEDURE — 99212 OFFICE O/P EST SF 10 MIN: CPT | Performed by: NURSE PRACTITIONER

## 2022-11-07 PROCEDURE — G8417 CALC BMI ABV UP PARAM F/U: HCPCS | Performed by: NURSE PRACTITIONER

## 2022-11-07 PROCEDURE — 3017F COLORECTAL CA SCREEN DOC REV: CPT | Performed by: NURSE PRACTITIONER

## 2022-11-07 ASSESSMENT — ENCOUNTER SYMPTOMS
NAUSEA: 0
SORE THROAT: 0
DIARRHEA: 0
VOMITING: 0
BLOOD IN STOOL: 0
EYE DISCHARGE: 0
WHEEZING: 0
TROUBLE SWALLOWING: 0
CHOKING: 0
COLOR CHANGE: 0
ABDOMINAL PAIN: 0
ABDOMINAL DISTENTION: 0
EYE ITCHING: 0
SHORTNESS OF BREATH: 0
COUGH: 0
CONSTIPATION: 0
STRIDOR: 0
BACK PAIN: 1

## 2022-11-07 NOTE — PROGRESS NOTES
[facility] PHYSICIAN SERVICES  [facility] INTERNAL MEDICINE  [address]  [place] [place] [address]  Dept: [phone]  Dept Fax: [phone]: [phone]    Anand Hernadez (:  1965) is a 59 y.o. male,Established patient  with green , here for evaluation of the following chief complaint(s): Other (Left ankle swollen again)      Anand Hernadez is a 59 y.o. male who presents today for his medical conditions/complaints as noted below. Anand Hernadez is c/sharon Other (Left ankle swollen again)        HPI:     Chief Complaint   Patient presents with    Other     Left ankle swollen again     HPI   #1 swelling of left leg and ankle. He has this from time to time we have done several studies on him for DVTs. He really has no symptoms of DVT today's calf is nontender there is no redness areas but he has swelling and I think this is all from his just systemic rheumatoid. He just wanted to be sure that I did not think it was heart failure or anything like that he is not swelling except on the left leg and does not look like he is septic or acute flare right now. Past Medical History:   Diagnosis Date    Arthritis     Back pain     Elevated white blood cell count, unspecified     Gout     History of blood transfusion     Hypertension     Mixed hyperlipidemia 2021    Nicotine dependence, unspecified, uncomplicated       Past Surgical History:   Procedure Laterality Date    ABDOMEN SURGERY      APPENDECTOMY      CYST INCISION AND DRAINAGE Left     Hip-MRSA    DENTAL SURGERY      Total dental extraction    FOREIGN BODY REMOVAL N/A 2019    RECTAL FOREIGN BODY REMOVAL performed by Saintclair Meter, DO at [facility]    ?        Vitals 2022 10/4/2022 2022 2022 2022 1312   SYSTOLIC 890 [phone]   DIASTOLIC 70 68 64 64 70 70   Pulse 97 100 - 66 - 94   Temp 98.6 - - 97.8 - -   Resp - - - - - -   SpO2 95 97 - 96 - 95   Weight 239 lb 230 lb - 220 lb 8 oz - -   Height 5' 11\" 5' 11\" - - - -   Body mass index 33.33 kg/m2 32.07 kg/m2 - - - -   Pain Level - - - - - -   Some recent data might be hidden       Family History   Problem Relation Age of Onset    Diabetes Father     Arthritis Father     Stroke Father     Colon Cancer Maternal Grandfather        Social History     Tobacco Use    Smoking status: Every Day     Packs/day: 1.00     Years: 41.00     Pack years: 41.00     Types: Cigarettes    Smokeless tobacco: Current   Substance Use Topics    Alcohol use: No      Current Outpatient Medications   Medication Sig Dispense Refill    methotrexate (RHEUMATREX) 2.5 MG chemo tablet TAKE 10 TABLETS BY MOUTH EVERY WEDNESDAY 120 tablet 1    furosemide (LASIX) 20 MG tablet Take 1 tablet by mouth daily 60 tablet 3    benzonatate (TESSALON) 200 MG capsule TAKE 1 CAPSULE BY MOUTH 2 TIMES DAILY AS NEEDED FOR COUGH 60 capsule 1    simvastatin (ZOCOR) 20 MG tablet TAKE 1 TABLET BY MOUTH EVERY DAY AT NIGHT 30 tablet 5    vitamin D (ERGOCALCIFEROL) 1.25 MG (03460 UT) CAPS capsule Take 1 capsule by mouth once a week 30 capsule 3    losartan (COZAAR) 100 MG tablet Take 1 tablet by mouth in the morning. 90 tablet 1    folic acid (FOLVITE) 1 MG tablet Take 1 tablet by mouth in the morning. 90 tablet 1    hydroCHLOROthiazide (HYDRODIURIL) 25 MG tablet TAKE 1 TABLET BY MOUTH EVERY DAY IN THE MORNING 90 tablet 1    zinc gluconate 50 MG tablet Take 50 mg by mouth daily      vitamin C (ASCORBIC ACID) 500 MG tablet Take 500 mg by mouth daily      Cetirizine HCl (ZYRTEC ALLERGY) 10 MG CAPS Take by mouth       No current facility-administered medications for this visit. 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      Penicillins     Morphine Anxiety       Health Maintenance   Topic Date Due COVID-19 Vaccine (1) Never done    Meningococcal (ACWY) vaccine (1 - Risk start 2-23 months series) Never done    Hib vaccine (1 of 1 - Risk 1-dose series) Never done    Meningococcal B vaccine (1 of 4 - Increased Risk Bexsero 2-dose series) Never done    DTaP/Tdap/Td vaccine (1 - Tdap) Never done    Shingles vaccine (1 of 2) Never done    Flu vaccine (1) Never done    Annual Wellness Visit (AWV)  Never done    Pneumococcal 0-64 years Vaccine (1 - PCV) 08/06/2035 (Originally 1/18/1971)    A1C test (Diabetic or Prediabetic)  04/15/2023    Lipids  07/20/2023    Depression Screen  07/20/2023    Colorectal Cancer Screen  07/15/2024    Hepatitis C screen  Completed    HIV screen  Completed    Hepatitis A vaccine  Aged Out    Low dose CT lung screening  Discontinued       Lab Results   Component Value Date    LABA1C 6.0 04/15/2022     Lab Results   Component Value Date    PSA 1.46 07/20/2022    PSA 0.92 07/19/2021    PSA 1.57 05/14/2019     TSH   Date Value Ref Range Status   01/17/2022 1.680 0.270 - 4.200 uIU/mL Final   ]  Lab Results   Component Value Date     07/20/2022    K 3.9 07/20/2022     07/20/2022    CO2 28 07/20/2022    BUN 16 07/20/2022    CREATININE 0.8 07/20/2022    GLUCOSE 117 (H) 07/20/2022    CALCIUM 9.5 07/20/2022    PROT 7.2 07/20/2022    LABALBU 4.1 07/20/2022    BILITOT <0.2 07/20/2022    ALKPHOS 145 (H) 07/20/2022    AST 21 07/20/2022    ALT 37 07/20/2022    LABGLOM >60 07/20/2022    GFRAA >59 07/20/2022     Lab Results   Component Value Date    CHOL 151 (L) 07/20/2022    CHOL 189 01/17/2022    CHOL 151 (L) 07/19/2021     Lab Results   Component Value Date    TRIG 190 (H) 07/20/2022    TRIG 364 (H) 01/17/2022    TRIG 208 (H) 07/19/2021     Lab Results   Component Value Date    HDL 36 (L) 07/20/2022    HDL 36 (L) 01/17/2022    HDL 43 (L) 07/19/2021     Lab Results   Component Value Date    LDLCALC 77 07/20/2022    LDLCALC 80 01/17/2022    LDLCALC 66 07/19/2021     Lab Results   Component Value Date/Time     07/20/2022 07:46 AM     03/07/2011 08:35 AM    K 3.9 07/20/2022 07:46 AM    K 4.3 10/06/2019 05:15 PM    K 4.5 03/07/2011 08:35 AM     07/20/2022 07:46 AM     03/07/2011 08:35 AM    CO2 28 07/20/2022 07:46 AM    BUN 16 07/20/2022 07:46 AM    CREATININE 0.8 07/20/2022 07:46 AM    CREATININE 0.8 03/07/2011 08:35 AM    GLUCOSE 117 07/20/2022 07:46 AM    CALCIUM 9.5 07/20/2022 07:46 AM      Lab Results   Component Value Date    WBC 13.6 (H) 11/07/2022    HGB 16.0 11/07/2022    HCT 47.7 11/07/2022    MCV 97.5 (H) 11/07/2022     (H) 11/07/2022    LABLYMP 3.86 03/07/2011    LYMPHOPCT 24.3 11/07/2022    RBC 4.89 11/07/2022    MCH 32.7 (H) 11/07/2022    MCHC 33.5 11/07/2022    RDW 14.2 11/07/2022     Lab Results   Component Value Date    VITD25 30.5 04/15/2022       Subjective:      Review of Systems   Constitutional:  Negative for fatigue, fever and unexpected weight change. HENT:  Negative for ear discharge, ear pain, mouth sores, sore throat and trouble swallowing. Eyes:  Negative for discharge, itching and visual disturbance. Respiratory:  Negative for cough, choking, shortness of breath, wheezing and stridor. Cardiovascular:  Negative for chest pain, palpitations and leg swelling. Trace swelling of left leg   Gastrointestinal:  Negative for abdominal distention, abdominal pain, blood in stool, constipation, diarrhea, nausea and vomiting. Endocrine: Negative for cold intolerance, polydipsia and polyuria. Genitourinary:  Negative for difficulty urinating, dysuria, frequency and urgency. Musculoskeletal:  Positive for arthralgias and back pain. Negative for gait problem. Skin:  Negative for color change and rash. Allergic/Immunologic: Negative for food allergies and immunocompromised state. Neurological:  Negative for dizziness, tremors, syncope, speech difficulty, weakness and headaches. Hematological:  Negative for adenopathy.  Does not bruise/bleed easily. Psychiatric/Behavioral:  Negative for confusion and hallucinations. Objective:     Physical Exam  Constitutional:       General: He is not in acute distress. Appearance: He is well-developed. HENT:      Head: Normocephalic and atraumatic. Eyes:      General: No scleral icterus. Right eye: No discharge. Left eye: No discharge. Pupils: Pupils are equal, round, and reactive to light. Neck:      Thyroid: No thyromegaly. Vascular: No JVD. Cardiovascular:      Rate and Rhythm: Normal rate and regular rhythm. Heart sounds: Normal heart sounds. No murmur heard. Pulmonary:      Effort: Pulmonary effort is normal. No respiratory distress. Breath sounds: Normal breath sounds. No wheezing or rales. Abdominal:      General: Bowel sounds are normal. There is no distension. Palpations: Abdomen is soft. There is no mass. Tenderness: There is no abdominal tenderness. There is no guarding or rebound. Musculoskeletal:         General: Swelling present. No tenderness. Normal range of motion. Cervical back: Normal range of motion and neck supple. Comments: Left leg is swollen a little bit at the ankle in his lower leg 1+ right is clear   Skin:     General: Skin is warm and dry. Findings: No erythema or rash. Neurological:      Mental Status: He is alert and oriented to person, place, and time. Cranial Nerves: No cranial nerve deficit. Coordination: Coordination normal.      Deep Tendon Reflexes: Reflexes are normal and symmetric. Reflexes normal.   Psychiatric:         Mood and Affect: Mood is not depressed. Behavior: Behavior normal.         Thought Content:  Thought content normal.         Judgment: Judgment normal.     /70   Pulse 97   Temp 98.6 °F (37 °C)   Ht 5' 11\" (1.803 m)   Wt 239 lb (108.4 kg)   SpO2 95%   BMI 33.33 kg/m²           Assessment:      Problem List       Dependent edema Suggested zip up compression stockings          Relevant Medications    hydroCHLOROthiazide (HYDRODIURIL) 25 MG tablet    losartan (COZAAR) 100 MG tablet    simvastatin (ZOCOR) 20 MG tablet    furosemide (LASIX) 20 MG tablet       Plan:        Patient given educational materials - see patient instructions. Discussed use, benefit, and side effects of prescribed medications. Allpatient questions answered. Pt voiced understanding. Reviewed health maintenance. Instructed to continue current medications, diet and exercise. Patient agreed with treatment plan. Follow up as directed. MEDICATIONS:  No orders of the defined types were placed in this encounter. ORDERS:  No orders of the defined types were placed in this encounter. Follow-up:  No follow-ups on file. PATIENT INSTRUCTIONS:  Patient Instructions   1. Dependent edema compression stockings are suggested  Electronically signed by DEMETRIS Dukes on 11/7/2022 at 3:43 PM    @    [facility]URITYDrjayme/transcription disclaimer:  Much of this encounter note is electronic transcription/translation of spoken language to printed texts. The electronic translation of spoken language may be erroneous, or at times,nonsensical words or phrases may be inadvertently transcribed.   Although I have reviewed the note for such errors, some may still exist.

## 2022-11-22 DIAGNOSIS — R05.9 COUGH, UNSPECIFIED TYPE: Primary | ICD-10-CM

## 2022-11-22 RX ORDER — METHYLPREDNISOLONE 4 MG/1
TABLET ORAL
Qty: 1 KIT | Refills: 0 | Status: SHIPPED | OUTPATIENT
Start: 2022-11-22 | End: 2022-11-28

## 2022-11-22 RX ORDER — LOSARTAN POTASSIUM 100 MG/1
TABLET ORAL
Qty: 90 TABLET | Refills: 1 | Status: SHIPPED | OUTPATIENT
Start: 2022-11-22

## 2022-11-22 RX ORDER — AZITHROMYCIN 250 MG/1
250 TABLET, FILM COATED ORAL SEE ADMIN INSTRUCTIONS
Qty: 6 TABLET | Refills: 0 | Status: SHIPPED | OUTPATIENT
Start: 2022-11-22 | End: 2022-11-27

## 2022-12-05 NOTE — TELEPHONE ENCOUNTER
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      Penicillins     Morphine Anxiety

## 2022-12-08 DIAGNOSIS — E78.2 MIXED HYPERLIPIDEMIA: ICD-10-CM

## 2022-12-08 RX ORDER — SIMVASTATIN 20 MG
20 TABLET ORAL NIGHTLY
Qty: 30 TABLET | Refills: 4 | Status: SHIPPED | OUTPATIENT
Start: 2022-12-08

## 2022-12-08 NOTE — TELEPHONE ENCOUNTER
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      Penicillins     Morphine Anxiety

## 2023-11-10 ENCOUNTER — TELEPHONE (OUTPATIENT)
Age: 58
End: 2023-11-10
Payer: COMMERCIAL

## 2023-11-10 NOTE — TELEPHONE ENCOUNTER
Message sent to [facility]    [1:22 PM] Lesli Brooks ([facility])  New Consult   Justin Antony 1965   Gertrude @ [facility]  [phone] called in consult. FELY Sousa is consulting for infected right native hip. Patient is in room 514.

## 2023-11-11 ENCOUNTER — OUTSIDE FACILITY SERVICE (OUTPATIENT)
Age: 58
End: 2023-11-11
Payer: MEDICARE

## 2023-11-13 ENCOUNTER — OUTSIDE FACILITY SERVICE (OUTPATIENT)
Age: 58
End: 2023-11-13
Payer: MEDICARE

## 2023-11-14 ENCOUNTER — OUTSIDE FACILITY SERVICE (OUTPATIENT)
Age: 58
End: 2023-11-14
Payer: MEDICARE

## 2023-11-22 NOTE — PROGRESS NOTES
[facility] [place]ious [place]    Patient:  Justin Antony  YOB: 1965  MRN: [id number]   Primary Care Physician: Criss Magallon APRN  Referring Physician: Ernesto Blanco PA-C     Chief Complaint:   Chief Complaint   Patient presents with    right hip septic arthritis     Interval History/HPI: Here for follow-up of possible right hip septic arthritis.  He had received about 5 days of IV antibiotic therapy in the hospital.  He was discharged home on oral Omnicef and clindamycin to complete his course of treatment.  He indicates he does not feel like anything is wrong in the right hip at this time.  He is not having pain or discomfort.  He has not noticed any redness or swelling.  He has not noticed any drainage.  He is without fever.  He has had some loose stools on the antibiotic treatment but no nausea or abdominal pain.  He feels he can tolerate the last 2 days of therapy.  He has had no new joint complaints.  He is up and ambulatory.  He hopes to go hunting today.      Allergies:   Allergies   Allergen Reactions    Asa [Aspirin] Other (See Comments)     No spleen    Lorazepam Other (See Comments)     Altered mental status    Codeine Mental Status Change    Penicillins Itching     Current Scheduled Medications:   Current Outpatient Medications on File Prior to Visit   Medication Sig    allopurinol (ZYLOPRIM) 100 MG tablet Take 1 tablet by mouth Daily As Needed.    ascorbic acid (VITAMIN C) 500 MG tablet Take 1 tablet by mouth Daily.    cefdinir (OMNICEF) 300 MG capsule Take 1 capsule by mouth Every 12 (Twelve) Hours.    Cetirizine HCl (ZyrTEC Allergy) 10 MG capsule Take 10 mg by mouth Daily.    Cholecalciferol 25 MCG (1000 UT) tablet Take 1 tablet by mouth Daily.    clindamycin (CLEOCIN) 300 MG capsule Take 2 capsules by mouth Every 8 (Eight) Hours.    folic acid (FOLVITE) 1 MG tablet Take 1 tablet by mouth Every Morning.    losartan (COZAAR) 100 MG tablet Take 1 tablet by  "mouth Every Morning.    methotrexate 2.5 MG tablet Take 10 tablets by mouth 1 (One) Time Per Week. Takes every Wednesday    predniSONE (DELTASONE) 20 MG tablet Take 0.5 tablets by mouth Take As Directed. For RA flare    rOPINIRole (REQUIP) 0.5 MG tablet Take 1 tablet by mouth 3 (Three) Times a Day.    simvastatin (ZOCOR) 40 MG tablet Take 1 tablet by mouth Every Night.    Zinc 50 MG tablet Take 1 tablet by mouth Daily.    [DISCONTINUED] colchicine 0.6 MG tablet Take 2 tablets x 1 dose; take 1 tablet one hour later     No current facility-administered medications on file prior to visit.      Venous Access Review  Line/IV site: No current IV Access    Antimicrobial Review  Currently on antibiotics/antifungals: YES/NO: YES  Start Date of Therapy: Clindamcyin and Omnicef 11/14/2023  If therapy completed, date complete: 11/28/2023    Review of Systems See HPI.    Vital Signs:  /60 Comment: manual left arm  Pulse 95   Temp 98.4 °F (36.9 °C) (Temporal)   Ht 180.3 cm (71\")   Wt 89.7 kg (197 lb 12.8 oz)   SpO2 98%   BMI 27.59 kg/m²     Physical Exam  Vital signs - reviewed.  Right hip incision well-healed  No erythema, warmth, or tenderness.    No drainage from right hip    Lab/Imaging/Other Information: No labs since hospital discharge    Impression & Recommendations:   Diagnoses and all orders for this visit:    1. Pyogenic arthritis of right hip, due to unspecified organism (Primary)    He is doing very well.  No signs or symptoms suggestive of persistent infection.  He will complete a reasonable course of antibiotic treatment following his last doses of Omnicef and clindamycin treatment tomorrow.  We discussed signs and symptoms suggestive of persistent infection and he will call for earlier appointment should those symptoms develop.  Otherwise plan to stop Omnicef and clinda after last doses tomorrow.  Follow-up appointment in 3 weeks.  Lab a few days prior to follow-up including CBC and CRP  He was " comfortable with the plan we discussed.    Follow Up:   Patient Instructions   Discontinue Omincef and Clindamycin after last 2 days  Return in about 3 weeks (around 12/18/2023).  Patient was provided After Visit Summary.     MD Criss Masterson APRN

## 2023-11-27 ENCOUNTER — OFFICE VISIT (OUTPATIENT)
Age: 58
End: 2023-11-27
Payer: MEDICARE

## 2023-11-27 VITALS
DIASTOLIC BLOOD PRESSURE: 60 MMHG | WEIGHT: 197.8 LBS | OXYGEN SATURATION: 98 % | TEMPERATURE: 98.4 F | HEART RATE: 95 BPM | SYSTOLIC BLOOD PRESSURE: 126 MMHG | HEIGHT: 71 IN | BODY MASS INDEX: 27.69 KG/M2

## 2023-11-27 DIAGNOSIS — M00.9 PYOGENIC ARTHRITIS OF RIGHT HIP, DUE TO UNSPECIFIED ORGANISM: Primary | ICD-10-CM

## 2023-11-27 RX ORDER — ROPINIROLE 0.5 MG/1
1 TABLET, FILM COATED ORAL 3 TIMES DAILY
COMMUNITY
Start: 2023-11-13

## 2023-11-27 RX ORDER — ZINC GLUCONATE 50 MG
1 TABLET ORAL DAILY
COMMUNITY

## 2023-11-27 RX ORDER — CEFDINIR 300 MG/1
300 CAPSULE ORAL EVERY 12 HOURS
COMMUNITY
Start: 2023-11-14 | End: 2023-11-29

## 2023-11-27 RX ORDER — CLINDAMYCIN HYDROCHLORIDE 300 MG/1
600 CAPSULE ORAL EVERY 8 HOURS
COMMUNITY
Start: 2023-11-14 | End: 2023-11-29

## 2023-11-27 RX ORDER — METHOTREXATE 2.5 MG/1
25 TABLET ORAL WEEKLY
COMMUNITY
Start: 2023-08-16

## 2023-11-27 RX ORDER — MELATONIN
1000 DAILY
COMMUNITY

## 2023-11-27 RX ORDER — LOSARTAN POTASSIUM 100 MG/1
1 TABLET ORAL EVERY MORNING
COMMUNITY
Start: 2023-09-05

## 2023-11-27 RX ORDER — CETIRIZINE HYDROCHLORIDE 10 MG/1
10 CAPSULE, LIQUID FILLED ORAL DAILY
COMMUNITY

## 2023-11-27 RX ORDER — ALLOPURINOL 100 MG/1
100 TABLET ORAL DAILY PRN
COMMUNITY
Start: 2023-10-21

## 2023-11-27 RX ORDER — ASCORBIC ACID 500 MG
500 TABLET ORAL DAILY
COMMUNITY

## 2023-11-27 RX ORDER — SIMVASTATIN 40 MG
1 TABLET ORAL NIGHTLY
COMMUNITY
Start: 2023-07-17

## 2023-11-27 RX ORDER — FOLIC ACID 1 MG/1
1 TABLET ORAL EVERY MORNING
COMMUNITY
Start: 2023-08-15

## 2023-11-27 RX ORDER — PREDNISONE 20 MG/1
10 TABLET ORAL TAKE AS DIRECTED
COMMUNITY
Start: 2023-11-04

## 2023-12-13 ENCOUNTER — TELEPHONE (OUTPATIENT)
Age: 58
End: 2023-12-13
Payer: MEDICARE

## 2023-12-14 NOTE — PROGRESS NOTES
[facility] – [place] - Infectious Diseases Progress Note    Patient:  Justin Antony  YOB: 1965  MRN: [id number]   Primary Care Physician: Criss Magallon APRN  Referring Physician: Ernesto Blanco PA-C     Chief Complaint:   Chief Complaint   Patient presents with    right hip septic arthritis     No complaints      Interval History/HPI: Here today for follow-up of presumptive right hip prosthetic joint infection.  He is doing very well.  He has been off antibiotics approaching 3 weeks.  He has not noticed any right hip pain or discomfort.  He has had no nausea, diarrhea, or rash.  He has had no redness, swelling, warmth, or drainage.  He indicates he is going to have a right rotator cuff repair.  He indicates his primary care provider is LACI Peraza with [facility] internal medicine.    Allergies:   Allergies   Allergen Reactions    Asa [Aspirin] Other (See Comments)     No spleen    Lorazepam Other (See Comments)     Altered mental status    Codeine Mental Status Change    Penicillins Itching     Current Scheduled Medications:   Current Outpatient Medications on File Prior to Visit   Medication Sig    ascorbic acid (VITAMIN C) 500 MG tablet Take 1 tablet by mouth Daily.    Cetirizine HCl (ZyrTEC Allergy) 10 MG capsule Take 10 mg by mouth Daily.    Cholecalciferol 25 MCG (1000 UT) tablet Take 1 tablet by mouth Daily.    folic acid (FOLVITE) 1 MG tablet Take 1 tablet by mouth Every Morning.    HYDROcodone-acetaminophen (NORCO) 5-325 MG per tablet Take 1 tablet by mouth.    losartan (COZAAR) 100 MG tablet Take 1 tablet by mouth Every Morning.    methotrexate 2.5 MG tablet Take 10 tablets by mouth 1 (One) Time Per Week. Takes every Wednesday    rOPINIRole (REQUIP) 0.5 MG tablet Take 1 tablet by mouth 3 (Three) Times a Day.    simvastatin (ZOCOR) 40 MG tablet Take 1 tablet by mouth Every Night.    Zinc 50 MG tablet Take 1 tablet by mouth Daily.    allopurinol (ZYLOPRIM) 100 MG tablet Take 1 tablet by mouth  "Daily As Needed. (Patient not taking: Reported on 12/18/2023)    predniSONE (DELTASONE) 20 MG tablet Take 0.5 tablets by mouth Take As Directed. For RA flare     No current facility-administered medications on file prior to visit.      Venous Access Review  Line/IV site: No current IV Access  Antimicrobial Review  Currently on antibiotics/antifungals: YES/NO: NO  Clindamcyin and Omnicef 11/14/2023  If therapy completed, date complete: 11/28/2023       Review of Systems See HPI.    Vital Signs:  /80 (BP Location: Right arm, Patient Position: Sitting, Cuff Size: Adult)   Pulse 83   Temp 97.3 °F (36.3 °C) (Temporal)   Ht 180.3 cm (71\")   Wt 88.6 kg (195 lb 6.4 oz)   SpO2 98%   BMI 27.25 kg/m²     Physical Exam  Vital signs - reviewed.  Right hip incision well-healed  Right hip incision shows no erythema, warmth, induration, tenderness, or drainage  Picture of right hip placed in chart    Lab/Imaging/Other Information:   CMP and CBC from December 18, 2023 reviewed.  C-reactive Protein (12/18/2023 09:27)     Impression & Recommendations:   Diagnoses and all orders for this visit:    1. Pyogenic arthritis of right hip, due to unspecified organism (Primary)    He is doing very well.  He does not seem to be manifesting any signs or symptoms of infection following completion of his antibiotic treatment.  We discussed signs and symptoms suggestive of recurrent infection and he will call for earlier appointment should they develop.  He otherwise will keep follow-up with his orthopedic surgeon and with his primary care clinician (LACI Peraza) and follow-up with infectious diseases as needed.  I do not see any infectious disease related contraindication to him undergoing his upcoming rotator cuff repair.  He was comfortable with the plan we discussed.    Follow Up:   There are no Patient Instructions on file for this visit.  Return if symptoms worsen or fail to improve.  Patient was provided After Visit " Summary.     MD Criss Masterson APRN Robert Unger, PA Phillip Hunt, MD

## 2023-12-18 ENCOUNTER — OFFICE VISIT (OUTPATIENT)
Age: 58
End: 2023-12-18
Payer: MEDICARE

## 2023-12-18 VITALS
TEMPERATURE: 97.3 F | HEIGHT: 71 IN | WEIGHT: 195.4 LBS | HEART RATE: 83 BPM | BODY MASS INDEX: 27.35 KG/M2 | OXYGEN SATURATION: 98 % | DIASTOLIC BLOOD PRESSURE: 80 MMHG | SYSTOLIC BLOOD PRESSURE: 138 MMHG

## 2023-12-18 DIAGNOSIS — M00.9 PYOGENIC ARTHRITIS OF RIGHT HIP, DUE TO UNSPECIFIED ORGANISM: ICD-10-CM

## 2023-12-18 DIAGNOSIS — M00.9 PYOGENIC ARTHRITIS OF RIGHT HIP, DUE TO UNSPECIFIED ORGANISM: Primary | ICD-10-CM

## 2023-12-18 RX ORDER — HYDROCODONE BITARTRATE AND ACETAMINOPHEN 5; 325 MG/1; MG/1
1 TABLET ORAL
COMMUNITY
Start: 2023-12-12 | End: 2023-12-27

## 2024-12-12 ENCOUNTER — OUTSIDE FACILITY SERVICE (OUTPATIENT)
Age: 59
End: 2024-12-12
Payer: MEDICARE

## 2024-12-13 ENCOUNTER — OUTSIDE FACILITY SERVICE (OUTPATIENT)
Age: 59
End: 2024-12-13

## 2024-12-13 PROCEDURE — OUTSIDEPOS PR OUTSIDE POS PLACEHOLDER: Performed by: INTERNAL MEDICINE

## 2024-12-14 ENCOUNTER — OUTSIDE FACILITY SERVICE (OUTPATIENT)
Age: 59
End: 2024-12-14

## 2024-12-14 PROCEDURE — OUTSIDEPOS PR OUTSIDE POS PLACEHOLDER: Performed by: INTERNAL MEDICINE

## 2024-12-27 ENCOUNTER — CLINICAL SUPPORT (OUTPATIENT)
Age: 59
End: 2024-12-27
Payer: MEDICARE

## 2024-12-27 ENCOUNTER — TELEPHONE (OUTPATIENT)
Age: 59
End: 2024-12-27
Payer: MEDICARE

## 2024-12-27 DIAGNOSIS — M00.9 PYOGENIC ARTHRITIS OF RIGHT HIP, DUE TO UNSPECIFIED ORGANISM: Primary | ICD-10-CM

## 2024-12-27 NOTE — PROGRESS NOTES
[facility] [place]    Patient:  Justin Antony 59 y.o. male  YOB: 1965  MRN: [id number]  Primary Care Physician: Criss Magallon APRN  REFERRING PROVIDER: Dontrell Schmitt MD    Chief Complaint Discontinue PICC line      Left arm PICC removed without difficulty with tip intact and measured 47 cm.  Patient tolerated without problems.

## 2024-12-27 NOTE — TELEPHONE ENCOUNTER
"JOSE Yost with [facility]  called asking for orders to DC his PICC.  Dr. Senior's last inpatient progress note states \"Pull PICC line at the end of intravenous antibiotic therapy.\"  He completed IV antibiotics on 12/26/2024.  I gave verbal order OK to DC PICC today or Monday.  Ritika said they MIGHT have a nurse available today.  She will let me know.        13:42 CST  Ritika called back and said a nurse scheduled to DC his PICC at 8 PM or he was told he would have to wait until Monday.  He prefers to come to our office and he's headed this way now.  I told her I will call her back with confirmation afterwards.    "

## 2025-01-06 ENCOUNTER — TELEPHONE (OUTPATIENT)
Age: 60
End: 2025-01-06
Payer: MEDICARE

## 2025-01-06 NOTE — TELEPHONE ENCOUNTER
Called to speak with Justin regarding his appointment with Dr. Senior on January 6. The call was no answered. Will call again to reschedule.    The appointment was cancelled due to snow and ice.

## 2025-01-08 NOTE — PROGRESS NOTES
[facility] – [place] - Infectious Diseases Progress Note    Patient:  Justin Antony  YOB: 1965  MRN: [id number]   Primary Care Physician: Carmel Wiggins MD  Referring Physician: Dontrell Schmitt MD     Chief Complaint: Probable left hip septic arthritis  >> no complaints whole lot better than it was<<    Interval History/HPI: He is here today for follow-up.  He had had possible left hip septic arthritis.  His cultures were negative.  He completed empiric course of antibiotic therapy.  He indicates he is doing fine.  He is not having fevers or chills.  He is not noticing redness, swelling, or drainage.  He is ambulatory without limitation.    Addendum: He was seen and examined on the date of this note.  Note inadvertently not completed.  Note was in my epic in basket as an unfinished note.  Note was completed from memory on February 27, 2025.  CBL    Allergies:   Allergies   Allergen Reactions    Asa [Aspirin] Other (See Comments)     No spleen    Lorazepam Other (See Comments)     Altered mental status    Codeine Mental Status Change    Penicillins Itching     Current Scheduled Medications:   Current Outpatient Medications on File Prior to Visit   Medication Sig    ascorbic acid (VITAMIN C) 500 MG tablet Take 1 tablet by mouth Daily.    Cetirizine HCl (ZyrTEC Allergy) 10 MG capsule Take 10 mg by mouth Daily.    Cholecalciferol 25 MCG (1000 UT) tablet Take 1 tablet by mouth Daily.    Elderberry 500 MG capsule Take 500 mg by mouth.    folic acid (FOLVITE) 1 MG tablet Take 1 tablet by mouth Every Morning.    losartan (COZAAR) 100 MG tablet Take 1 tablet by mouth Every Morning.    methotrexate 2.5 MG tablet Take 10 tablets by mouth 1 (One) Time Per Week. Takes every Wednesday    rOPINIRole (REQUIP) 0.5 MG tablet Take 1 tablet by mouth 3 (Three) Times a Day.    Zinc 50 MG tablet Take 1 tablet by mouth Daily.    allopurinol (ZYLOPRIM) 100 MG tablet Take 1 tablet by mouth Daily As Needed. (Patient not taking:  "Reported on 1/9/2025)    predniSONE (DELTASONE) 20 MG tablet Take 0.5 tablets by mouth Take As Directed. For RA flare (Patient not taking: Reported on 1/9/2025)    simvastatin (ZOCOR) 40 MG tablet Take 1 tablet by mouth Every Night. (Patient not taking: Reported on 1/9/2025)     No current facility-administered medications on file prior to visit.      Venous Access Review  Line/IV site: No current IV Access  Antimicrobial Review  Currently on antibiotics/antifungals: YES/NO: NO  Start Date of Therapy: Vancomycin and Ceftriaxone 11/14/2024  If therapy completed, date complete: na  Estimated end of treatment date (EOT): 12/26/2024    Review of Systems See HPI.    Vital Signs:  /77 (BP Location: Right arm, Patient Position: Sitting, Cuff Size: Large Adult)   Pulse 90   Temp 98.1 °F (36.7 °C) (Oral)   Ht 180.3 cm (71\")   Wt 96.2 kg (212 lb)   SpO2 90%   BMI 29.57 kg/m²     Physical Exam  Vital signs - reviewed.  Left hip incision well-healed  No erythema, warmth, induration, or tenderness    Lab/Imaging/Other Information:  His lab flowsheet was reviewed  LABORATORY - SCAN - LAB FLOW SHEET - ID - 12/29/24 (12/29/2024)       Impression & Recommendations:   Diagnoses and all orders for this visit:    1. Pyogenic arthritis of right hip, due to unspecified organism (Primary)    He had had presumptive left hip native joint septic arthritis.  He completed antibiotic treatment.  He has had no signs or symptoms suggestive of recurrence.  We discussed signs and symptoms suggestive of infection and he will call should they develop.  He otherwise will keep his follow-up with Dr. Carmel Senior his primary care physician and Dr. Schmitt of orthopedic surgery.  He will follow-up with infectious diseases as needed.  He was comfortable with that plan.    Follow Up:   There are no Patient Instructions on file for this visit.  No follow-ups on file.  Patient was provided After Visit Summary.     Brian Senior MD    CC: Carmel " MD Dontrell Senior MD

## 2025-01-09 ENCOUNTER — OFFICE VISIT (OUTPATIENT)
Age: 60
End: 2025-01-09
Payer: MEDICARE

## 2025-01-09 VITALS
SYSTOLIC BLOOD PRESSURE: 143 MMHG | OXYGEN SATURATION: 90 % | TEMPERATURE: 98.1 F | DIASTOLIC BLOOD PRESSURE: 77 MMHG | HEART RATE: 90 BPM | HEIGHT: 71 IN | BODY MASS INDEX: 29.68 KG/M2 | WEIGHT: 212 LBS

## 2025-01-09 DIAGNOSIS — M00.9 PYOGENIC ARTHRITIS OF RIGHT HIP, DUE TO UNSPECIFIED ORGANISM: Primary | ICD-10-CM

## (undated) DEVICE — GLOVE SURG SZ 7 L12IN FNGR THK94MIL TRNSLUC YEL LTX HYDRGEL

## (undated) DEVICE — ARYGLE SUCTION CATHETER WITH CHIMNEY VALVE STRIAGHT PACKED 18 FR/ CH: Brand: ARGYLE

## (undated) DEVICE — PAD,ABDOMINAL,8"X10",ST,LF: Brand: MEDLINE

## (undated) DEVICE — GLOVE SURG SZ 7 CRM LTX FREE POLYISOPRENE POLYMER BEAD ANTI

## (undated) DEVICE — DRAPE, LAVH, STERILE: Brand: MEDLINE

## (undated) DEVICE — SOLUTION IV IRRIG POUR BRL 0.9% SODIUM CHL 2F7124

## (undated) DEVICE — TRAY PREP DRY W/ PREM GLV 2 APPL 6 SPNG 2 UNDPD 1 OVERWRAP

## (undated) DEVICE — MAJOR CDS

## (undated) DEVICE — CURAVIEW LED LARYNSCP BLDE